# Patient Record
Sex: FEMALE | Race: WHITE | Employment: OTHER | URBAN - METROPOLITAN AREA
[De-identification: names, ages, dates, MRNs, and addresses within clinical notes are randomized per-mention and may not be internally consistent; named-entity substitution may affect disease eponyms.]

---

## 2017-05-05 ENCOUNTER — HOSPITAL ENCOUNTER (OUTPATIENT)
Dept: MAMMOGRAPHY | Age: 72
Discharge: HOME | End: 2017-05-05
Attending: INTERNAL MEDICINE
Payer: MEDICARE

## 2017-05-05 DIAGNOSIS — Z12.31 ENCOUNTER FOR SCREENING MAMMOGRAM FOR BREAST CANCER: ICD-10-CM

## 2017-05-05 PROCEDURE — 77067 SCR MAMMO BI INCL CAD: CPT

## 2017-05-12 ENCOUNTER — HOSPITAL ENCOUNTER (OUTPATIENT)
Dept: MAMMOGRAPHY | Age: 72
Discharge: HOME | End: 2017-05-12
Attending: INTERNAL MEDICINE
Payer: MEDICARE

## 2017-05-12 DIAGNOSIS — R92.8 ABNORMAL SCREENING MAMMOGRAM: ICD-10-CM

## 2017-05-12 PROCEDURE — 76642 ULTRASOUND BREAST LIMITED: CPT

## 2017-05-12 PROCEDURE — 77065 DX MAMMO INCL CAD UNI: CPT

## 2018-09-05 ENCOUNTER — HOSPITAL ENCOUNTER (OUTPATIENT)
Dept: MAMMOGRAPHY | Age: 73
Discharge: HOME | End: 2018-09-05
Payer: MEDICARE

## 2018-09-05 DIAGNOSIS — Z87.81 HISTORY OF FRACTURE: ICD-10-CM

## 2018-09-05 PROCEDURE — 77080 DXA BONE DENSITY AXIAL: CPT

## 2018-09-12 ENCOUNTER — HOSPITAL ENCOUNTER (OUTPATIENT)
Dept: NON INVASIVE DIAGNOSTICS | Age: 73
Discharge: HOME | End: 2018-09-12
Payer: MEDICARE

## 2018-09-12 DIAGNOSIS — I49.9 CARDIAC ARRHYTHMIA, UNSPECIFIED CARDIAC ARRHYTHMIA TYPE: ICD-10-CM

## 2018-09-12 PROCEDURE — 93306 TTE W/DOPPLER COMPLETE: CPT

## 2019-09-18 ENCOUNTER — HOSPITAL ENCOUNTER (OUTPATIENT)
Dept: MAMMOGRAPHY | Age: 74
Discharge: HOME | End: 2019-09-18
Attending: INTERNAL MEDICINE
Payer: MEDICARE

## 2019-09-18 DIAGNOSIS — Z12.39 SCREENING FOR BREAST CANCER: ICD-10-CM

## 2019-09-18 PROCEDURE — 77067 SCR MAMMO BI INCL CAD: CPT

## 2019-11-13 ENCOUNTER — HOSPITAL ENCOUNTER (OUTPATIENT)
Dept: ULTRASOUND IMAGING | Age: 74
Discharge: HOME | End: 2019-11-13
Payer: MEDICARE

## 2019-11-13 DIAGNOSIS — I70.202 ATHEROSCLEROSIS OF ARTERY OF LEFT LOWER EXTREMITY (HCC): ICD-10-CM

## 2019-11-13 PROCEDURE — 93925 LOWER EXTREMITY STUDY: CPT

## 2020-03-10 ENCOUNTER — APPOINTMENT (OUTPATIENT)
Dept: GENERAL RADIOLOGY | Age: 75
DRG: 310 | End: 2020-03-10
Attending: EMERGENCY MEDICINE
Payer: MEDICARE

## 2020-03-10 ENCOUNTER — HOSPITAL ENCOUNTER (INPATIENT)
Age: 75
LOS: 2 days | Discharge: HOME | DRG: 310 | End: 2020-03-13
Attending: EMERGENCY MEDICINE | Admitting: HOSPITALIST
Payer: MEDICARE

## 2020-03-10 DIAGNOSIS — I48.91 ATRIAL FIBRILLATION WITH RVR (HCC): Primary | ICD-10-CM

## 2020-03-10 DIAGNOSIS — I50.9 ACUTE ON CHRONIC CONGESTIVE HEART FAILURE, UNSPECIFIED HEART FAILURE TYPE (HCC): ICD-10-CM

## 2020-03-10 LAB
BASOPHILS # BLD: 0.1 K/UL (ref 0–0.2)
BASOPHILS NFR BLD: 1 % (ref 0–2)
DIFFERENTIAL METHOD BLD: NORMAL
EOSINOPHIL # BLD: 0.2 K/UL (ref 0–0.8)
EOSINOPHIL NFR BLD: 3 % (ref 0.5–7.8)
ERYTHROCYTE [DISTWIDTH] IN BLOOD BY AUTOMATED COUNT: 14.3 % (ref 11.9–14.6)
HCT VFR BLD AUTO: 36.9 % (ref 35.8–46.3)
HGB BLD-MCNC: 12.3 G/DL (ref 11.7–15.4)
IMM GRANULOCYTES # BLD AUTO: 0 K/UL (ref 0–0.5)
IMM GRANULOCYTES NFR BLD AUTO: 0 % (ref 0–5)
LYMPHOCYTES # BLD: 1.4 K/UL (ref 0.5–4.6)
LYMPHOCYTES NFR BLD: 20 % (ref 13–44)
MCH RBC QN AUTO: 29.4 PG (ref 26.1–32.9)
MCHC RBC AUTO-ENTMCNC: 33.3 G/DL (ref 31.4–35)
MCV RBC AUTO: 88.3 FL (ref 79.6–97.8)
MONOCYTES # BLD: 0.6 K/UL (ref 0.1–1.3)
MONOCYTES NFR BLD: 8 % (ref 4–12)
NEUTS SEG # BLD: 4.9 K/UL (ref 1.7–8.2)
NEUTS SEG NFR BLD: 68 % (ref 43–78)
NRBC # BLD: 0 K/UL (ref 0–0.2)
PLATELET # BLD AUTO: 243 K/UL (ref 150–450)
PMV BLD AUTO: 11 FL (ref 9.4–12.3)
RBC # BLD AUTO: 4.18 M/UL (ref 4.05–5.2)
WBC # BLD AUTO: 7.1 K/UL (ref 4.3–11.1)

## 2020-03-10 PROCEDURE — 93005 ELECTROCARDIOGRAM TRACING: CPT | Performed by: EMERGENCY MEDICINE

## 2020-03-10 PROCEDURE — 84443 ASSAY THYROID STIM HORMONE: CPT

## 2020-03-10 PROCEDURE — 83735 ASSAY OF MAGNESIUM: CPT

## 2020-03-10 PROCEDURE — 84484 ASSAY OF TROPONIN QUANT: CPT

## 2020-03-10 PROCEDURE — 85025 COMPLETE CBC W/AUTO DIFF WBC: CPT

## 2020-03-10 PROCEDURE — 96374 THER/PROPH/DIAG INJ IV PUSH: CPT

## 2020-03-10 PROCEDURE — 71045 X-RAY EXAM CHEST 1 VIEW: CPT

## 2020-03-10 PROCEDURE — 80053 COMPREHEN METABOLIC PANEL: CPT

## 2020-03-10 PROCEDURE — 74011000250 HC RX REV CODE- 250: Performed by: EMERGENCY MEDICINE

## 2020-03-10 PROCEDURE — 99285 EMERGENCY DEPT VISIT HI MDM: CPT

## 2020-03-10 PROCEDURE — 83880 ASSAY OF NATRIURETIC PEPTIDE: CPT

## 2020-03-10 RX ORDER — DILTIAZEM HYDROCHLORIDE 5 MG/ML
20 INJECTION INTRAVENOUS
Status: COMPLETED | OUTPATIENT
Start: 2020-03-10 | End: 2020-03-10

## 2020-03-10 RX ORDER — LISINOPRIL 40 MG/1
40 TABLET ORAL
COMMUNITY
End: 2020-03-31 | Stop reason: ALTCHOICE

## 2020-03-10 RX ADMIN — DILTIAZEM HYDROCHLORIDE 20 MG: 5 INJECTION INTRAVENOUS at 23:54

## 2020-03-11 PROBLEM — R00.2 PALPITATIONS: Status: ACTIVE | Noted: 2020-03-11

## 2020-03-11 PROBLEM — I48.91 ATRIAL FIBRILLATION WITH RVR (HCC): Status: ACTIVE | Noted: 2020-03-11

## 2020-03-11 PROBLEM — I10 HTN (HYPERTENSION): Status: ACTIVE | Noted: 2020-03-11

## 2020-03-11 LAB
ALBUMIN SERPL-MCNC: 3.5 G/DL (ref 3.2–4.6)
ALBUMIN/GLOB SERPL: 1 {RATIO} (ref 1.2–3.5)
ALP SERPL-CCNC: 151 U/L (ref 50–136)
ALT SERPL-CCNC: 63 U/L (ref 12–65)
ANION GAP SERPL CALC-SCNC: 7 MMOL/L (ref 7–16)
ANION GAP SERPL CALC-SCNC: 9 MMOL/L (ref 7–16)
AST SERPL-CCNC: 40 U/L (ref 15–37)
ATRIAL RATE: 153 BPM
BASOPHILS # BLD: 0.1 K/UL (ref 0–0.2)
BASOPHILS NFR BLD: 1 % (ref 0–2)
BILIRUB SERPL-MCNC: 0.6 MG/DL (ref 0.2–1.1)
BNP SERPL-MCNC: 2233 PG/ML
BUN SERPL-MCNC: 19 MG/DL (ref 8–23)
BUN SERPL-MCNC: 22 MG/DL (ref 8–23)
CALCIUM SERPL-MCNC: 8.8 MG/DL (ref 8.3–10.4)
CALCIUM SERPL-MCNC: 8.9 MG/DL (ref 8.3–10.4)
CALCULATED R AXIS, ECG10: 59 DEGREES
CALCULATED T AXIS, ECG11: -143 DEGREES
CHLORIDE SERPL-SCNC: 104 MMOL/L (ref 98–107)
CHLORIDE SERPL-SCNC: 111 MMOL/L (ref 98–107)
CO2 SERPL-SCNC: 27 MMOL/L (ref 21–32)
CO2 SERPL-SCNC: 28 MMOL/L (ref 21–32)
CREAT SERPL-MCNC: 0.94 MG/DL (ref 0.6–1)
CREAT SERPL-MCNC: 1.01 MG/DL (ref 0.6–1)
DIAGNOSIS, 93000: NORMAL
DIFFERENTIAL METHOD BLD: NORMAL
EOSINOPHIL # BLD: 0.2 K/UL (ref 0–0.8)
EOSINOPHIL NFR BLD: 2 % (ref 0.5–7.8)
ERYTHROCYTE [DISTWIDTH] IN BLOOD BY AUTOMATED COUNT: 13.9 % (ref 11.9–14.6)
GLOBULIN SER CALC-MCNC: 3.4 G/DL (ref 2.3–3.5)
GLUCOSE SERPL-MCNC: 110 MG/DL (ref 65–100)
GLUCOSE SERPL-MCNC: 145 MG/DL (ref 65–100)
HCT VFR BLD AUTO: 38.6 % (ref 35.8–46.3)
HGB BLD-MCNC: 12.8 G/DL (ref 11.7–15.4)
IMM GRANULOCYTES # BLD AUTO: 0 K/UL (ref 0–0.5)
IMM GRANULOCYTES NFR BLD AUTO: 0 % (ref 0–5)
LYMPHOCYTES # BLD: 1 K/UL (ref 0.5–4.6)
LYMPHOCYTES NFR BLD: 13 % (ref 13–44)
MAGNESIUM SERPL-MCNC: 2.1 MG/DL (ref 1.8–2.4)
MCH RBC QN AUTO: 29.2 PG (ref 26.1–32.9)
MCHC RBC AUTO-ENTMCNC: 33.2 G/DL (ref 31.4–35)
MCV RBC AUTO: 87.9 FL (ref 79.6–97.8)
MONOCYTES # BLD: 0.8 K/UL (ref 0.1–1.3)
MONOCYTES NFR BLD: 10 % (ref 4–12)
NEUTS SEG # BLD: 5.9 K/UL (ref 1.7–8.2)
NEUTS SEG NFR BLD: 75 % (ref 43–78)
NRBC # BLD: 0 K/UL (ref 0–0.2)
PLATELET # BLD AUTO: 244 K/UL (ref 150–450)
PMV BLD AUTO: 10.9 FL (ref 9.4–12.3)
POTASSIUM SERPL-SCNC: 3.1 MMOL/L (ref 3.5–5.1)
POTASSIUM SERPL-SCNC: 3.6 MMOL/L (ref 3.5–5.1)
PROT SERPL-MCNC: 6.9 G/DL (ref 6.3–8.2)
Q-T INTERVAL, ECG07: 256 MS
QRS DURATION, ECG06: 78 MS
QTC CALCULATION (BEZET), ECG08: 403 MS
RBC # BLD AUTO: 4.39 M/UL (ref 4.05–5.2)
SODIUM SERPL-SCNC: 141 MMOL/L (ref 136–145)
SODIUM SERPL-SCNC: 145 MMOL/L (ref 136–145)
TROPONIN I SERPL-MCNC: <0.02 NG/ML (ref 0.02–0.05)
TSH SERPL DL<=0.005 MIU/L-ACNC: 2.46 UIU/ML
VENTRICULAR RATE, ECG03: 149 BPM
WBC # BLD AUTO: 7.9 K/UL (ref 4.3–11.1)

## 2020-03-11 PROCEDURE — 80048 BASIC METABOLIC PNL TOTAL CA: CPT

## 2020-03-11 PROCEDURE — 74011250637 HC RX REV CODE- 250/637: Performed by: INTERNAL MEDICINE

## 2020-03-11 PROCEDURE — 85025 COMPLETE CBC W/AUTO DIFF WBC: CPT

## 2020-03-11 PROCEDURE — 74011000258 HC RX REV CODE- 258: Performed by: INTERNAL MEDICINE

## 2020-03-11 PROCEDURE — 74011250636 HC RX REV CODE- 250/636: Performed by: EMERGENCY MEDICINE

## 2020-03-11 PROCEDURE — 74011250636 HC RX REV CODE- 250/636: Performed by: INTERNAL MEDICINE

## 2020-03-11 PROCEDURE — 74011250637 HC RX REV CODE- 250/637: Performed by: FAMILY MEDICINE

## 2020-03-11 PROCEDURE — 36415 COLL VENOUS BLD VENIPUNCTURE: CPT

## 2020-03-11 PROCEDURE — 74011000258 HC RX REV CODE- 258: Performed by: FAMILY MEDICINE

## 2020-03-11 PROCEDURE — 74011250636 HC RX REV CODE- 250/636: Performed by: FAMILY MEDICINE

## 2020-03-11 PROCEDURE — 65610000001 HC ROOM ICU GENERAL

## 2020-03-11 PROCEDURE — 74011000258 HC RX REV CODE- 258: Performed by: EMERGENCY MEDICINE

## 2020-03-11 PROCEDURE — 93306 TTE W/DOPPLER COMPLETE: CPT

## 2020-03-11 RX ORDER — LISINOPRIL 20 MG/1
40 TABLET ORAL DAILY
Status: DISCONTINUED | OUTPATIENT
Start: 2020-03-11 | End: 2020-03-12 | Stop reason: HOSPADM

## 2020-03-11 RX ORDER — SODIUM CHLORIDE 0.9 % (FLUSH) 0.9 %
5-40 SYRINGE (ML) INJECTION AS NEEDED
Status: DISCONTINUED | OUTPATIENT
Start: 2020-03-11 | End: 2020-03-12 | Stop reason: HOSPADM

## 2020-03-11 RX ORDER — ENOXAPARIN SODIUM 100 MG/ML
40 INJECTION SUBCUTANEOUS EVERY 24 HOURS
Status: DISCONTINUED | OUTPATIENT
Start: 2020-03-11 | End: 2020-03-11

## 2020-03-11 RX ORDER — ADHESIVE BANDAGE
30 BANDAGE TOPICAL DAILY PRN
Status: DISCONTINUED | OUTPATIENT
Start: 2020-03-11 | End: 2020-03-12 | Stop reason: HOSPADM

## 2020-03-11 RX ORDER — SODIUM CHLORIDE 0.9 % (FLUSH) 0.9 %
5-40 SYRINGE (ML) INJECTION EVERY 8 HOURS
Status: DISCONTINUED | OUTPATIENT
Start: 2020-03-11 | End: 2020-03-12 | Stop reason: HOSPADM

## 2020-03-11 RX ORDER — FUROSEMIDE 10 MG/ML
40 INJECTION INTRAMUSCULAR; INTRAVENOUS
Status: COMPLETED | OUTPATIENT
Start: 2020-03-11 | End: 2020-03-11

## 2020-03-11 RX ORDER — ACETAMINOPHEN 325 MG/1
650 TABLET ORAL
Status: DISCONTINUED | OUTPATIENT
Start: 2020-03-11 | End: 2020-03-12 | Stop reason: HOSPADM

## 2020-03-11 RX ADMIN — POTASSIUM BICARBONATE 40 MEQ: 782 TABLET, EFFERVESCENT ORAL at 09:02

## 2020-03-11 RX ADMIN — ENOXAPARIN SODIUM 40 MG: 40 INJECTION SUBCUTANEOUS at 05:35

## 2020-03-11 RX ADMIN — POTASSIUM BICARBONATE 40 MEQ: 782 TABLET, EFFERVESCENT ORAL at 18:27

## 2020-03-11 RX ADMIN — Medication 10 ML: at 21:16

## 2020-03-11 RX ADMIN — SODIUM CHLORIDE 14.5 MG/HR: 900 INJECTION, SOLUTION INTRAVENOUS at 09:31

## 2020-03-11 RX ADMIN — SODIUM CHLORIDE 20 MG/HR: 900 INJECTION, SOLUTION INTRAVENOUS at 19:31

## 2020-03-11 RX ADMIN — Medication 10 ML: at 00:54

## 2020-03-11 RX ADMIN — Medication 10 ML: at 05:36

## 2020-03-11 RX ADMIN — APIXABAN 5 MG: 5 TABLET, FILM COATED ORAL at 21:15

## 2020-03-11 RX ADMIN — SODIUM CHLORIDE 2.5 MG/HR: 900 INJECTION, SOLUTION INTRAVENOUS at 00:16

## 2020-03-11 RX ADMIN — SODIUM CHLORIDE 20 MG/HR: 900 INJECTION, SOLUTION INTRAVENOUS at 14:25

## 2020-03-11 RX ADMIN — FUROSEMIDE 40 MG: 10 INJECTION, SOLUTION INTRAMUSCULAR; INTRAVENOUS at 00:53

## 2020-03-11 RX ADMIN — ACETAMINOPHEN 650 MG: 325 TABLET, FILM COATED ORAL at 13:09

## 2020-03-11 RX ADMIN — LISINOPRIL 40 MG: 20 TABLET ORAL at 08:58

## 2020-03-11 RX ADMIN — SODIUM CHLORIDE 20 MG/HR: 900 INJECTION, SOLUTION INTRAVENOUS at 23:48

## 2020-03-11 RX ADMIN — APIXABAN 5 MG: 5 TABLET, FILM COATED ORAL at 09:31

## 2020-03-11 RX ADMIN — Medication 10 ML: at 16:41

## 2020-03-11 RX ADMIN — ACETAMINOPHEN 650 MG: 325 TABLET, FILM COATED ORAL at 23:45

## 2020-03-11 NOTE — PROGRESS NOTES
Nutrition Assessment for:   Best Practice Alert for Malnutrition Screening Tool: Recently Lost Weight Without Trying: No, If Yes, How Much Weight Loss: Unsure, Eating Poorly Due to Decreased Appetite: No    Assessment:  DIET CARDIAC Regular    Patient is a new admission and breakfast is to be first meal. The patient reports good appetite and will likely meet her needs. The patient is noted to have a h/o A-fib and HTN. She is currently admitted due to her A-fib. She denies any weight loss or po difficulties. She has no questions or concerns for nutrition at this time. Anthropometrics:  Height: 5' 5\" (165.1 cm), Weight: 77.1 kg (170 lb),  , Body mass index is 28.29 kg/m². BMI class of overweight. Macronutrient needs: (using Admission weight 77.1 kg)  EER: 1164-4218 kcal/day (20-25 kcal/kg)  EPR: 77-96 g/day (1-1.25 g/kg)    Nutrition Diagnosis:  No nutrition diagnosis at this time. Nutrition Intervention:  Meals and snacks: Continue current diet  Coordination of nutrition care by a Nutrition Professional: ICU rounds  Discharge Nutrition Plan: No discharge needs at this time. Veda Agosto [address] 85, [address],    [phone]

## 2020-03-11 NOTE — PROGRESS NOTES
Interdisciplinary team rounds were held 3/11/2020 with the following team members:Care Management, Nursing, Pastoral Care, Physical Therapy and Clinical Coordinator and the patient. Plan of care discussed. See clinical pathway and/or care plan for interventions and desired outcomes.

## 2020-03-11 NOTE — PROGRESS NOTES
Patient has come to terms with pure wick almost a liter out with crystal clear urine  Some leg cramps  Orange juice given   Waiting on lab results

## 2020-03-11 NOTE — PROGRESS NOTES
Patient was present in the ICU during an [address] [facility] record downtime, therefore, all elements of charting may not be present in the immediate view of the electronic medical record. Please see written and/or manual documentation associated with this visit for a complete documentation of this visit. Patient here with complaint of palpitations and found to have atrial fibrillation. She has no chest pain but has bladder urgency as she has received Lasix.   Bathed per ICU protocol and found to have intact pink skin with a small birthmark on the left shoulder

## 2020-03-11 NOTE — CONSULTS
[address] [facility]    Name:  Marilu Carlos  MR#:  [id number]  :  1945  ACCOUNT #:  [de-identified]  DATE OF SERVICE:  2020    CARDIOLOGY CONSULTATION NOTE    REFERRING PHYSICIAN:  Dr. Nick Mullen. CLINICAL INDICATION FOR CONSULTATION:  Atrial fibrillation. HISTORY OF PRESENT ILLNESS:  The patient is a 68-year-old  female whom was admitted to [address] [place] [address] Intensive Care Unit via the emergency room with new-onset atrial fibrillation. She states that she has a longstanding history of systemic hypertension, which has been managed with lisinopril. She states that she has noticed her blood pressure elevated over the last several days. She states she went out to eat supper with her  and friends last night and felt fine, but upon arriving home, she began not feeling well. She states she took her blood pressure and it was elevated approximately 170/120. She then sought medical attention. She apparently went to an urgent care facility and was transferred to the ER, where she was diagnosed with atrial fibrillation with rapid ventricular response rate. She was started on IV Cardizem and later started on Eliquis. We were asked to see the patient in regards to her presenting symptoms. The patient denies any prior history of cardiovascular disease. She denies knowledge of previous atrial fibrillation, stroke, TIA, valvular heart disease, myocardial infarction, chest pain, congestive heart failure, diabetes, or vascular disease. ADDITIONAL PAST MEDICAL HISTORY:  Hypertension. FAMILY HISTORY:  Father  in his early 80 secondary to advanced age. Mother  at 80years of age secondary to a CVA. She had a history of coronary artery disease. SOCIAL HISTORY:  The patient is . She does not smoke or drink. HOME MEDICATION:  Lisinopril 40 mg daily. ALLERGIES:  NO STATED ALLERGIES.     REVIEW OF SYSTEMS:  SKIN:  The patient denies rash.  NEURO:  The patient denies stroke, TIA, or seizure. HEENT:  The patient denies headache. PULMONARY:  The patient denies cough, fever, chills, COPD, or hemoptysis. GI:  The patient denies melena, hematochezia, or hematemesis. :  The patient denies hematuria or dysuria. MUSCULOSKELETAL:  The patient denies recent fall or fracture. ENDOCRINE:  The patient denies diabetes or thyroid disease. HEMATOLOGIC:  The patient denies any bleeding issues or anemia. PSYCH:  The patient denies anxiety or depression. PHYSICAL EXAMINATION:  VITAL SIGNS:  Blood pressure 115/70, pulse is 110, respirations 16. GENERAL:  The patient is a pleasant elderly female, in no acute distress. SKIN:  Warm and dry. NEURO:  Alert and oriented. PSYCH:  Normal mood and affect. HEENT:  Normocephalic, atraumatic. Pupils reactive to light. NECK:  Supple. 2+ carotid upstrokes without bruits or JVD. CHEST:  Clear. CARDIAC EXAM:  Reveals an irregular rate and rhythm without significant audible murmur, rub, or gallop. ABDOMEN:  Soft, nondistended without masses. EXTREMITIES:  Show no cyanosis, edema, or clubbing. AVAILABLE LAB DATA:  1. ECG showed atrial fibrillation with low QRS voltage and poor R-wave progression. 2.  CBC:  WBC is 7.9, hemoglobin is 12.8, hematocrit is 38.6, platelet count is 932,194. 3.  Electrolytes:  Sodium is 141, potassium is 3.1, chloride is 104, CO2 is 28, creatinine is 0.94, BUN is 19, glucose is 145. NT-proBNP on admission was 2233. Chest x-ray on admission showed borderline cardiomegaly with mild vascular congestion. IMPRESSION AND PLAN:  1. Presumed new-onset atrial fibrillation with rapid ventricular response rate: The patient has been admitted to the ICU, which I agree with continuous telemetry. She has a [id number] score of 4, which places her at increased risk of thromboembolic events in the setting of atrial fibrillation.   It would appear the oral anticoagulation therapy is indicated. She has been started on Eliquis and I would agree with the addition of this medicine. Her heart rate control is being maintained with IV Cardizem and I agree with this at the present time. Transthoracic echo has been completed, the results are presently pending. This will be reviewed. If transthoracic echo study is unremarkable and she remains in atrial fibrillation, then I would consider trying to reestablish normal sinus rhythm with a transesophageal echo directed cardioversion within the next 24 hours. I explained the indications, risks, complications of transesophageal echo, electrical cardioversion as well as chronic oral anticoagulation therapy. She understands and wishes to proceed in this manner. 2.  Systemic hypertension:  The patient will be maintained on her present oral home antihypertension medication in the form of lisinopril. Considerations regarding the addition of beta blocker or calcium channel blocker may be considered in the setting of new-onset atrial fibrillation. 3.  Hypokalemia:  Additional potassium supplementation has been added. We will do a followup BNP in the morning to make sure that her electrolyte status is stable.     We will follow along with you clinically and additional recommendations forthcoming      MD ELMER [email or web address]  D:  03/11/2020 11:45  T:  03/11/2020 13:29  JOB #:  [id number]

## 2020-03-11 NOTE — CONSULTS
[facility] CARDIOLOGY PROGRESS NOTE           3/11/2020 11:22 AM    Admit Date: 3/10/2020      Subjective: The patient is seen and examined in ICU. Consult requested for new onset atrial fibrillation. Consult dictated. DCH#:[id number]. She reports elevated BP last night with suddenly not feeling well. In the ER atrial fibrillation with RVR was dx. She has been started on an iv Cardizem drip and Eliquis. She reports no prior CV hx.  PMH:Hypertension. FH: Father  in his 80\"s. Mother  atv 81 yo due CAD and CVA  SH:. No Tobacco or ETOH: use. ROS:  GEN:  No fever or chills  Cardiovascular:  As noted above:no CP or palpitations. Pulmonary:  As noted above:no cough or SOB. No COPD. Neuro:  No new focal motor or sensory loss. No hx of CVA/TIA. GI:No melena or bleeding. Vasc:no PAD,AAA,dissection,or aortic plague. Objective:      Vitals:    20 0710 20 0728 20 0743 20 0813   BP: 95/73 124/72 114/89 122/79   Pulse: (!) 142 (!) 117 (!) 123 (!) 139   Resp: 17 23 24 16   Temp: 97.8 °F (36.6 °C)      SpO2: 93% 92% 94% 94%   Weight:       Height:           Physical Exam:  General-no distress  Neck- supple, no JVD  CV- irregular rate and rhythm no MRG  Lung- clear bilaterally  Abd- soft, nontender, nondistended  Ext- no edema bilaterally. Skin- warm and dry  Psychiatric:  Normal mood and affect. Neurologic:  Alert and oriented X 3      Data Review:   Recent Labs     20  0438 03/10/20  2336    145   K 3.1* 3.6   MG  --  2.1   BUN 19 22   CREA 0.94 1.01*   * 110*   WBC 7.9 7.1   HGB 12.8 12.3   HCT 38.6 36.9    243       TELEMETRY:  AF with HR in 100-110. Assessment/Plan:     Principal Problem:    Atrial fibrillation with RVR ([facility]) (3/11/2020):[id number]=6.Agree with iv Cardizem infusion to control tachycardia. Agree with [facility] ( NOAC). Review TTE and if unremarkable consider LUCI cardioversion to attempt to re establish NSR. I explained risks,complications,and indications of OAC,cardioversion,and LUCI. Correct hypokalemia. Will follow with you. Active Problems:    HTN (hypertension) (3/11/2020):Continue home medication ( ie Lisinopril). Hypokalemia:Add KCL. BMP in am.              Rafy Tran MD  3/11/2020 11:22 AM

## 2020-03-11 NOTE — PROGRESS NOTES
Problem: Atrial fibrillation disease management teaching  Goal: *Able to cope  Outcome: Progressing Towards Goal

## 2020-03-11 NOTE — PROGRESS NOTES
Hospitalist Note     Admit Date:  3/10/2020 11:19 PM   Name:  Roselyn Henriquez   Age:  66 y.o.  :  1945   MRN:  [id number]   PCP:  John Ramirez MD  Treatment Team: Attending Provider: Katherine Yuen MD; Staff Nurse: Jimbo Linares RN    HPI/Subjective:   Mrs. Alicia Johnson is a nice 65 y/o WF with a h/o HTN who was admitted on 3/11 with new onset AFib RVR.    3/11: Cardizem @ 12; rates still fluctuating 120s-130s. BPs good. She feels better. Son and  at bedside, updated. ROS neg otherwise.     No other complaints  Objective:     Patient Vitals for the past 24 hrs:   Temp Pulse Resp BP SpO2   20 0813  (!) 139 16 122/79 94 %   20 0743  (!) 123 24 114/89 94 %   20 0728  (!) 117 23 124/72 92 %   20 0710 97.8 °F (36.6 °C) (!) 142 17 95/73 93 %   20 0643  (!) 119 14 125/89 (!) 87 %   20 0628  (!) 118 15 114/85 (!) 86 %   20 0613  (!) 135 14 110/71 95 %   20 0558  (!) 140  98/76 94 %   20 0543  (!) 123  107/79 96 %   20 0528  (!) 118 10 119/76 92 %   20 0513  (!) 124 8 123/69 93 %   20 0458  (!) 115 17 126/77 93 %   20 0452    140/75    20 0443  (!) 118 19 140/76 92 %   20 0428  (!) 118 20 137/79 94 %   20 0413  (!) 122 13 134/82 92 %   20 0345 98 °F (36.7 °C) (!) 130  102/68    20 0330  (!) 140  112/74    20 0315  (!) 144  119/80    20 0300  (!) 128  120/74    20 0245  (!) 130  125/74    20 0230  (!) 147  126/78    20 0215  (!) 132 22 119/75    20 0200  (!) 129 20 132/74    20 0145  (!) 144 22 (!) 160/98    20 0125 98.7 °F (37.1 °C) (!) 128 22 160/88 95 %   20 0052  (!) 109 24  94 %   20 0050    144/87    20 0048 98.2 °F (36.8 °C) (!) 115 18 (!) 136/94 94 %   20 0047  (!) 142 16 (!) 136/94 94 %   20 0046  (!) 120      20 0045  (!) 108      20 0044  (!) 104      03/11/20 0003  (!) 119  (!) 144/96 95 %   03/10/20 2357    (!) 146/95 95 %   03/10/20 2354  (!) 150 16 (!) 183/120 95 %   03/10/20 2353  (!) 135  (!) 183/120 96 %   03/10/20 2327  (!) 161 18  95 %   03/10/20 2313 97.8 °F (36.6 °C) (!) 138 18 (!) 174/100 97 %     Oxygen Therapy  O2 Sat (%): 94 % (03/11/20 0813)  Pulse via Oximetry: 145 beats per minute (03/11/20 0813)  O2 Device: Room air (03/11/20 0125)    Estimated body mass index is 28.29 kg/m² as calculated from the following:    Height as of this encounter: 5' 5\" (1.651 m). Weight as of this encounter: 77.1 kg (170 lb). Intake/Output Summary (Last 24 hours) at 3/11/2020 0856  Last data filed at 3/11/2020 0545  Gross per 24 hour   Intake 41.24 ml   Output 900 ml   Net -858.76 ml       *Note that automatically entered I/Os may not be accurate; dependent on patient compliance with collection and accurate  by [employer]. General:    Well nourished. Alert. CV:   Irreg irreg, tachycardia. No murmur, rub, or gallop. Lungs:   CTAB. No wheezing, rhonchi, or rales. Abdomen:   Soft, nontender, nondistended. Extremities: Warm and dry. No cyanosis or edema. Skin:     No rashes or jaundice.    Neuro:  No gross focal deficits    Data Review:  I have reviewed all labs, meds, and studies from the last 24 hours:    Recent Results (from the past 24 hour(s))   CBC WITH AUTOMATED DIFF    Collection Time: 03/10/20 11:36 PM   Result Value Ref Range    WBC 7.1 4.3 - 11.1 K/uL    RBC 4.18 4.05 - 5.2 M/uL    HGB 12.3 11.7 - 15.4 g/dL    HCT 36.9 35.8 - 46.3 %    MCV 88.3 79.6 - 97.8 FL    MCH 29.4 26.1 - 32.9 PG    MCHC 33.3 31.4 - 35.0 g/dL    RDW 14.3 11.9 - 14.6 %    PLATELET 629 390 - 107 K/uL    MPV 11.0 9.4 - 12.3 FL    ABSOLUTE NRBC 0.00 0.0 - 0.2 K/uL    DF AUTOMATED      NEUTROPHILS 68 43 - 78 %    LYMPHOCYTES 20 13 - 44 %    MONOCYTES 8 4.0 - 12.0 %    EOSINOPHILS 3 0.5 - 7.8 %    BASOPHILS 1 0.0 - 2.0 %    IMMATURE GRANULOCYTES 0 0.0 - 5.0 %    ABS. NEUTROPHILS 4.9 1.7 - 8.2 K/UL    ABS. LYMPHOCYTES 1.4 0.5 - 4.6 K/UL    ABS. MONOCYTES 0.6 0.1 - 1.3 K/UL    ABS. EOSINOPHILS 0.2 0.0 - 0.8 K/UL    ABS. BASOPHILS 0.1 0.0 - 0.2 K/UL    ABS. IMM. GRANS. 0.0 0.0 - 0.5 K/UL   METABOLIC PANEL, COMPREHENSIVE    Collection Time: 03/10/20 11:36 PM   Result Value Ref Range    Sodium 145 136 - 145 mmol/L    Potassium 3.6 3.5 - 5.1 mmol/L    Chloride 111 (H) 98 - 107 mmol/L    CO2 27 21 - 32 mmol/L    Anion gap 7 7 - 16 mmol/L    Glucose 110 (H) 65 - 100 mg/dL    BUN 22 8 - 23 MG/DL    Creatinine 1.01 (H) 0.6 - 1.0 MG/DL    GFR est AA >60 >60 ml/min/1.73m2    GFR est non-AA 57 (L) >60 ml/min/1.73m2    Calcium 8.9 8.3 - 10.4 MG/DL    Bilirubin, total 0.6 0.2 - 1.1 MG/DL    ALT (SGPT) 63 12 - 65 U/L    AST (SGOT) 40 (H) 15 - 37 U/L    Alk.  phosphatase 151 (H) 50 - 136 U/L    Protein, total 6.9 6.3 - 8.2 g/dL    Albumin 3.5 3.2 - 4.6 g/dL    Globulin 3.4 2.3 - 3.5 g/dL    A-G Ratio 1.0 (L) 1.2 - 3.5     TROPONIN I    Collection Time: 03/10/20 11:36 PM   Result Value Ref Range    Troponin-I, Qt. <0.02 (L) 0.02 - 0.05 NG/ML   MAGNESIUM    Collection Time: 03/10/20 11:36 PM   Result Value Ref Range    Magnesium 2.1 1.8 - 2.4 mg/dL   TSH 3RD GENERATION    Collection Time: 03/10/20 11:36 PM   Result Value Ref Range    TSH 2.460 uIU/mL   NT-PRO BNP    Collection Time: 03/10/20 11:36 PM   Result Value Ref Range    NT pro-BNP 2,233 (H) <463 PG/ML   METABOLIC PANEL, BASIC    Collection Time: 03/11/20  4:38 AM   Result Value Ref Range    Sodium 141 136 - 145 mmol/L    Potassium 3.1 (L) 3.5 - 5.1 mmol/L    Chloride 104 98 - 107 mmol/L    CO2 28 21 - 32 mmol/L    Anion gap 9 7 - 16 mmol/L    Glucose 145 (H) 65 - 100 mg/dL    BUN 19 8 - 23 MG/DL    Creatinine 0.94 0.6 - 1.0 MG/DL    GFR est AA >60 >60 ml/min/1.73m2    GFR est non-AA >60 >60 ml/min/1.73m2    Calcium 8.8 8.3 - 10.4 MG/DL   CBC WITH AUTOMATED DIFF    Collection Time: 03/11/20  4:38 AM Result Value Ref Range    WBC 7.9 4.3 - 11.1 K/uL    RBC 4.39 4.05 - 5.2 M/uL    HGB 12.8 11.7 - 15.4 g/dL    HCT 38.6 35.8 - 46.3 %    MCV 87.9 79.6 - 97.8 FL    MCH 29.2 26.1 - 32.9 PG    MCHC 33.2 31.4 - 35.0 g/dL    RDW 13.9 11.9 - 14.6 %    PLATELET 985 618 - 600 K/uL    MPV 10.9 9.4 - 12.3 FL    ABSOLUTE NRBC 0.00 0.0 - 0.2 K/uL    DF AUTOMATED      NEUTROPHILS 75 43 - 78 %    LYMPHOCYTES 13 13 - 44 %    MONOCYTES 10 4.0 - 12.0 %    EOSINOPHILS 2 0.5 - 7.8 %    BASOPHILS 1 0.0 - 2.0 %    IMMATURE GRANULOCYTES 0 0.0 - 5.0 %    ABS. NEUTROPHILS 5.9 1.7 - 8.2 K/UL    ABS. LYMPHOCYTES 1.0 0.5 - 4.6 K/UL    ABS. MONOCYTES 0.8 0.1 - 1.3 K/UL    ABS. EOSINOPHILS 0.2 0.0 - 0.8 K/UL    ABS. BASOPHILS 0.1 0.0 - 0.2 K/UL    ABS. IMM. GRANS. 0.0 0.0 - 0.5 K/UL        All Micro Results     None          Current Meds:  Current Facility-Administered Medications   Medication Dose Route Frequency    lisinopriL (PRINIVIL, ZESTRIL) tablet 40 mg  40 mg Oral DAILY    sodium chloride (NS) flush 5-40 mL  5-40 mL IntraVENous Q8H    sodium chloride (NS) flush 5-40 mL  5-40 mL IntraVENous PRN    acetaminophen (TYLENOL) tablet 650 mg  650 mg Oral Q4H PRN    magnesium hydroxide (MILK OF MAGNESIA) 400 mg/5 mL oral suspension 30 mL  30 mL Oral DAILY PRN    dilTIAZem (CARDIZEM) 100 mg in 0.9% sodium chloride (MBP/ADV) 100 mL infusion  0-15 mg/hr IntraVENous TITRATE    potassium bicarb-citric acid (EFFER-K) tablet 40 mEq  40 mEq Oral BID    apixaban (ELIQUIS) tablet 5 mg  5 mg Oral BID       Other Studies:  No results found for this visit on 03/10/20. Xr Chest Port    Result Date: 3/10/2020  EXAM: Chest x-ray. INDICATION: Palpitations. COMPARISON: None. TECHNIQUE: Frontal view chest x-ray. FINDINGS: The heart is borderline enlarged, with mild vascular congestion, small pleural effusions and mild bibasilar lung atelectasis. No pneumothorax is identified.      IMPRESSION: Findings suspect for mild CHF or fluid overload. Assessment and Plan:     Hospital Problems as of 3/11/2020 Never Reviewed          Codes Class Noted - Resolved POA    * (Principal) Atrial fibrillation with RVR ([address] ICD-10-CM: I48.91  ICD-9-CM: 427.31  3/11/2020 - Present Yes        HTN (hypertension) ICD-10-CM: I10  ICD-9-CM: 401.9  3/11/2020 - Present Yes        Palpitations ICD-10-CM: R00.2  ICD-9-CM: 785.1  3/11/2020 - Present Yes              Plan:  # AFib RVR   - CHADSVASc at least 3. Start Eliquis, con't diltiazem drip   - Cards to see. TTE pending. TSH normal.     # HTN   - Con't lisinopril    DC planning/Dispo: Home when able.   Diet:  DIET CARDIAC  DVT ppx: Eliquis    Signed:  David Singh MD

## 2020-03-11 NOTE — ED TRIAGE NOTES
Pt sent from  for a fib, hypertension. States her heart has been racing on/off for the past few months but she has never been dx with a fib. Pt presents with EKG from [facility].

## 2020-03-11 NOTE — H&P
HOSPITALIST INITIAL HISTORY AND PHYSICAL    NAME:  Yarelis Tubbs   Age:  66 y.o.  :   1945   MRN:   [id number]  PCP: Lisa Toro MD  Consulting MD:  Treatment Team: Attending Provider: Lizzy Art MD; Primary Nurse: Mauro Robles RN    CHIEF COMPLAINT: palpitations    HISTORY OF PRESENT ILLNESS:   Yarelis Tubbs is a 66 y.o. female with a past medical history of HTN who presents to the ER with report of left-sided chest palpitations for the past several days. She denies any chest pain or SOB. Reports that she feels a bit better now. Franklin Kline REVIEW OF SYSTEMS: Comprehensive ROS performed. Denies fevers, chills, nausea, otherwise negative. Past Medical History:   Diagnosis Date    HTN (hypertension)         Past Surgical History:   Procedure Laterality Date    HX BREAST BIOPSY Left        Prior to Admission Medications   Prescriptions Last Dose Informant Patient Reported? Taking?   lisinopriL (PRINIVIL, ZESTRIL) 40 mg tablet   Yes No   Sig: Take 40 mg by mouth. Facility-Administered Medications: None       No Known Allergies    FAMILY HISTORY: Reviewed. Negative except History reviewed. No pertinent family history. Social History     Tobacco Use    Smoking status: Never Smoker    Smokeless tobacco: Never Used   Substance Use Topics    Alcohol use: Never     Frequency: Never         Objective:     Visit Vitals  BP (!) 144/96   Pulse (!) 119   Temp 97.8 °F (36.6 °C)   Resp 16   Ht 5' 5\" (1.651 m)   Wt 77.1 kg (170 lb)   SpO2 95%   BMI 28.29 kg/m²      Temp (24hrs), Av.8 °F (36.6 °C), Min:97.8 °F (36.6 °C), Max:97.8 °F (36.6 °C)    Oxygen Therapy  O2 Sat (%): 95 % (20 0003)  Pulse via Oximetry: 115 beats per minute (20)  O2 Device: Room air (03/10/20 9383)  Physical Exam:  General:    The patient is a pleasant elderly female in no acute distress. Head:   Normocephalic/atraumatic. Eyes:  No palpebral pallor or scleral icterus.   ENT:  External auricular and nasal exam within normal limits. Mucous membranes are moist.  Neck:  Supple, non-tender, no JVD. Lungs:   Clear to auscultation bilaterally without wheezes or crackles. No respiratory distress or accessory muscle use. Heart:   Irregular rhythm, tachycardia without murmurs, rubs, or gallops. Abdomen:   Soft, non-tender, non-distended with normoactive bowel sounds. Genitourinary: No tenderness over the bladder or bilateral CVAs. Extremities: Without clubbing, cyanosis, or edema. Skin:     Normal color, texture, and turgor. No rashes, lesions, or jaundice. Pulses: Radial and dorsalis pedis pulses present 2+ bilaterally. Capillary refill <2s. Neurologic: CN II-XII grossly intact and symmetrical.     Moving all four extremities well with no focal deficits. Psychiatric: Pleasant demeanor, appropriate affect. Alert and oriented x 3    Data Review:   Recent Results (from the past 24 hour(s))   CBC WITH AUTOMATED DIFF    Collection Time: 03/10/20 11:36 PM   Result Value Ref Range    WBC 7.1 4.3 - 11.1 K/uL    RBC 4.18 4.05 - 5.2 M/uL    HGB 12.3 11.7 - 15.4 g/dL    HCT 36.9 35.8 - 46.3 %    MCV 88.3 79.6 - 97.8 FL    MCH 29.4 26.1 - 32.9 PG    MCHC 33.3 31.4 - 35.0 g/dL    RDW 14.3 11.9 - 14.6 %    PLATELET 552 674 - 180 K/uL    MPV 11.0 9.4 - 12.3 FL    ABSOLUTE NRBC 0.00 0.0 - 0.2 K/uL    DF AUTOMATED      NEUTROPHILS 68 43 - 78 %    LYMPHOCYTES 20 13 - 44 %    MONOCYTES 8 4.0 - 12.0 %    EOSINOPHILS 3 0.5 - 7.8 %    BASOPHILS 1 0.0 - 2.0 %    IMMATURE GRANULOCYTES 0 0.0 - 5.0 %    ABS. NEUTROPHILS 4.9 1.7 - 8.2 K/UL    ABS. LYMPHOCYTES 1.4 0.5 - 4.6 K/UL    ABS. MONOCYTES 0.6 0.1 - 1.3 K/UL    ABS. EOSINOPHILS 0.2 0.0 - 0.8 K/UL    ABS. BASOPHILS 0.1 0.0 - 0.2 K/UL    ABS. IMM.  GRANS. 0.0 0.0 - 0.5 K/UL   METABOLIC PANEL, COMPREHENSIVE    Collection Time: 03/10/20 11:36 PM   Result Value Ref Range    Sodium 145 136 - 145 mmol/L    Potassium 3.6 3.5 - 5.1 mmol/L    Chloride 111 (H) 98 - 107 mmol/L    CO2 27 21 - 32 mmol/L    Anion gap 7 7 - 16 mmol/L    Glucose 110 (H) 65 - 100 mg/dL    BUN 22 8 - 23 MG/DL    Creatinine 1.01 (H) 0.6 - 1.0 MG/DL    GFR est AA >60 >60 ml/min/1.73m2    GFR est non-AA 57 (L) >60 ml/min/1.73m2    Calcium 8.9 8.3 - 10.4 MG/DL    Bilirubin, total 0.6 0.2 - 1.1 MG/DL    ALT (SGPT) 63 12 - 65 U/L    AST (SGOT) 40 (H) 15 - 37 U/L    Alk. phosphatase 151 (H) 50 - 136 U/L    Protein, total 6.9 6.3 - 8.2 g/dL    Albumin 3.5 3.2 - 4.6 g/dL    Globulin 3.4 2.3 - 3.5 g/dL    A-G Ratio 1.0 (L) 1.2 - 3.5     TROPONIN I    Collection Time: 03/10/20 11:36 PM   Result Value Ref Range    Troponin-I, Qt. <0.02 (L) 0.02 - 0.05 NG/ML   MAGNESIUM    Collection Time: 03/10/20 11:36 PM   Result Value Ref Range    Magnesium 2.1 1.8 - 2.4 mg/dL   TSH 3RD GENERATION    Collection Time: 03/10/20 11:36 PM   Result Value Ref Range    TSH 2.460 uIU/mL   NT-PRO BNP    Collection Time: 03/10/20 11:36 PM   Result Value Ref Range    NT pro-BNP 2,233 (H) <450 PG/ML       Imaging Juan Carlos Dawson /Studies:  Xr Chest Port    Result Date: 3/10/2020  IMPRESSION: Findings suspect for mild CHF or fluid overload. Assessment and Plan:     Principal Problem:    Atrial fibrillation with RVR ([address] (3/11/2020)    New onset. IV Cardizem gtt, TTE. Cardiology consult. Lovenox. Active Problems:    Palpitations (3/11/2020)    Per above. HTN (hypertension) (3/11/2020)    Stable. Continue home meds. DVT Prophylaxis: Lovenox      Code Status: FULL CODE      Disposition: Admit to remote [facility] for evaluation and treatment as per above.       Anticipated discharge: 2-3 days     Signed By: Zach Avitia MD     March 11, 2020

## 2020-03-11 NOTE — PROGRESS NOTES
Care Management Interventions  PCP Verified by CM: Yes  Mode of Transport at Discharge: Other (see comment)  Transition of Care Consult (CM Consult): Other  Current Support Network: Lives with Spouse  Confirm Follow Up Transport: Family  The Patient and/or Patient Representative was Provided with a Choice of Provider and Agrees with the Discharge Plan?: Yes  Freedom of Choice List was Provided with Basic Dialogue that Supports the Patient's Individualized Plan of Care/Goals, Treatment Preferences and Shares the Quality Data Associated with the Providers?: Yes  Discharge Location  Discharge Placement: Home  Visited with pt regarding plans for discharge, pt lives at home w/ spouse, she is inde with ADL's, works parttime @ [employer], see Dr. Aiyana Townsend and has no concerns at this time. CM will continue to follow.

## 2020-03-11 NOTE — ED NOTES
TRANSFER - OUT REPORT:    Verbal report given to karla on Binh Age  being transferred to ICU for routine progression of care       Report consisted of patients Situation, Background, Assessment and   Recommendations(SBAR). Information from the following report(s) SBAR was reviewed with the receiving nurse. Lines:   Peripheral IV 03/11/20 Right Forearm (Active)   Site Assessment Clean, dry, & intact 3/11/2020 12:22 AM   Phlebitis Assessment 0 3/11/2020 12:22 AM   Infiltration Assessment 0 3/11/2020 12:22 AM   Dressing Status Clean, dry, & intact 3/11/2020 12:22 AM        Opportunity for questions and clarification was provided.       Patient transported with:   Registered Nurse

## 2020-03-11 NOTE — ED PROVIDER NOTES
49-year-old female with history of hypertension presents from [facility] with complaint of palpitations and elevated blood pressure that she first noted earlier today. Patient was instructed that she had elevated blood pressure and atrial fibrillation with RVR. Patient states that her Alfonso Lin has been racing on and off for the past few months\". That she is never been officially diagnosed with atrial fibrillation. Patient denies chest pain, shortness of breath, nausea, vomiting, fever, chills, leg swelling or pain, hemoptysis, productive cough, dizziness, weakness. The history is provided by the patient. No  was used. Palpitations    This is a new problem. The current episode started 6 to 12 hours ago. The problem has not changed since onset. The problem occurs constantly. The problem is associated with nothing. Associated symptoms include irregular heartbeat. Pertinent negatives include no diaphoresis, no fever, no numbness, no chest pain, no chest pressure, no claudication, no near-syncope, no orthopnea, no PND, no syncope, no abdominal pain, no nausea, no vomiting, no headaches, no back pain, no leg pain, no lower extremity edema, no dizziness, no weakness, no cough, no hemoptysis, no shortness of breath and no sputum production. Risk factors include hypertension. She has tried nothing for the symptoms. The treatment provided no relief. Her past medical history is significant for hypertension. Hypertension    Associated symptoms include palpitations. Pertinent negatives include no chest pain, no orthopnea, no PND, no headaches, no dizziness, no shortness of breath, no nausea and no vomiting. Past Medical History:   Diagnosis Date    HTN (hypertension)        Past Surgical History:   Procedure Laterality Date    HX BREAST BIOPSY Left          History reviewed. No pertinent family history.     Social History     Socioeconomic History    Marital status:      Spouse name: Not on file    Number of children: Not on file    Years of education: Not on file    Highest education level: Not on file   Occupational History    Not on file   Social Needs    Financial resource strain: Not on file    Food insecurity:     Worry: Not on file     Inability: Not on file    Transportation needs:     Medical: Not on file     Non-medical: Not on file   Tobacco Use    Smoking status: Never Smoker    Smokeless tobacco: Never Used   Substance and Sexual Activity    Alcohol use: Never     Frequency: Never    Drug use: Never    Sexual activity: Not on file   Lifestyle    Physical activity:     Days per week: Not on file     Minutes per session: Not on file    Stress: Not on file   Relationships    Social connections:     Talks on phone: Not on file     Gets together: Not on file     Attends Jainism service: Not on file     Active member of club or organization: Not on file     Attends meetings of clubs or organizations: Not on file     Relationship status: Not on file    Intimate partner violence:     Fear of current or ex partner: Not on file     Emotionally abused: Not on file     Physically abused: Not on file     Forced sexual activity: Not on file   Other Topics Concern    Not on file   Social History Narrative    Not on file         ALLERGIES: Patient has no known allergies. Review of Systems   Constitutional: Negative for diaphoresis and fever. Respiratory: Negative for cough, hemoptysis, sputum production and shortness of breath. Cardiovascular: Positive for palpitations. Negative for chest pain, orthopnea, claudication, syncope, PND and near-syncope. Gastrointestinal: Negative for abdominal pain, nausea and vomiting. Musculoskeletal: Negative for back pain. Neurological: Negative for dizziness, weakness, numbness and headaches.        Vitals:    03/10/20 2313 03/10/20 2327   BP: (!) 174/100    Pulse: (!) 138 (!) 161   Resp: 18 18   Temp: 97.8 °F (36.6 °C)    SpO2: 97% 95%   Weight: 77.1 kg (170 lb)    Height: 5' 5\" (1.651 m)             Physical Exam  Vitals signs and nursing note reviewed. Constitutional:       Appearance: Normal appearance. Comments: Patient sitting up in bed in no acute distress. HENT:      Head: Normocephalic. Nose: Nose normal.      Mouth/Throat:      Mouth: Mucous membranes are moist.   Eyes:      Pupils: Pupils are equal, round, and reactive to light. Neck:      Comments: No JVD. Cardiovascular:      Rate and Rhythm: Tachycardia present. Rhythm irregular. Pulses: Normal pulses. Heart sounds: Normal heart sounds. Pulmonary:      Effort: Pulmonary effort is normal.      Breath sounds: Normal breath sounds. Abdominal:      Palpations: Abdomen is soft. Tenderness: There is no abdominal tenderness. Musculoskeletal:      Right lower leg: No edema. Left lower leg: No edema. Comments: No significant LE edema. No calf TTP. Skin:     Findings: No erythema. Neurological:      General: No focal deficit present. Mental Status: She is alert and oriented to person, place, and time. Motor: No weakness. Comments: Strength 5 out of 5 throughout. Normal sensory exam.           MDM  Number of Diagnoses or Management Options  Atrial fibrillation with RVR [facility]): new and requires workup  Diagnosis management comments: EKG with heart rate in the 140s to 150s w/ eidence of A. fib with RVR. Order placed for diltiazem 20 mg IV. Lab work-up pending. Hospitalist consulted for admission.  ======================================  Pro-BNP >2,000. Order placed for Lasix.           Amount and/or Complexity of Data Reviewed  Clinical lab tests: ordered and reviewed  Tests in the radiology section of CPT®: ordered and reviewed  Tests in the medicine section of CPT®: ordered and reviewed  Review and summarize past medical records: yes  Discuss the patient with other providers: yes  Independent visualization of images, tracings, or specimens: yes    Risk of Complications, Morbidity, and/or Mortality  Presenting problems: moderate  Diagnostic procedures: moderate  Management options: moderate    Patient Progress  Patient progress: stable    ED Course as of Mar 11 0028   Wed Mar 11, 2020   0003 CXR IMPRESSION: Findings suspect for mild CHF or fluid overload. [DF]      ED Course User Index  [DF] Obed Julien MD       EKG  Date/Time: 3/10/2020 11:47 PM  Performed by: Obed Julien MD  Authorized by: Obed Julien MD     ECG reviewed by ED Physician in the absence of a cardiologist: yes    Rate:     ECG rate:  149    ECG rate assessment: tachycardic    Rhythm:     Rhythm: atrial fibrillation    Ectopy:     Ectopy: none    QRS:     QRS axis:  Normal    QRS intervals:  Normal  Conduction:     Conduction: normal    ST segments:     ST segments:  Normal  T waves:     T waves: normal              Results Include:    Recent Results (from the past 24 hour(s))   CBC WITH AUTOMATED DIFF    Collection Time: 03/10/20 11:36 PM   Result Value Ref Range    WBC 7.1 4.3 - 11.1 K/uL    RBC 4.18 4.05 - 5.2 M/uL    HGB 12.3 11.7 - 15.4 g/dL    HCT 36.9 35.8 - 46.3 %    MCV 88.3 79.6 - 97.8 FL    MCH 29.4 26.1 - 32.9 PG    MCHC 33.3 31.4 - 35.0 g/dL    RDW 14.3 11.9 - 14.6 %    PLATELET 629 008 - 271 K/uL    MPV 11.0 9.4 - 12.3 FL    ABSOLUTE NRBC 0.00 0.0 - 0.2 K/uL    DF AUTOMATED      NEUTROPHILS 68 43 - 78 %    LYMPHOCYTES 20 13 - 44 %    MONOCYTES 8 4.0 - 12.0 %    EOSINOPHILS 3 0.5 - 7.8 %    BASOPHILS 1 0.0 - 2.0 %    IMMATURE GRANULOCYTES 0 0.0 - 5.0 %    ABS. NEUTROPHILS 4.9 1.7 - 8.2 K/UL    ABS. LYMPHOCYTES 1.4 0.5 - 4.6 K/UL    ABS. MONOCYTES 0.6 0.1 - 1.3 K/UL    ABS. EOSINOPHILS 0.2 0.0 - 0.8 K/UL    ABS. BASOPHILS 0.1 0.0 - 0.2 K/UL    ABS. IMM.  GRANS. 0.0 0.0 - 0.5 K/UL   METABOLIC PANEL, COMPREHENSIVE    Collection Time: 03/10/20 11:36 PM   Result Value Ref Range    Sodium 145 136 - 145 mmol/L Potassium 3.6 3.5 - 5.1 mmol/L    Chloride 111 (H) 98 - 107 mmol/L    CO2 27 21 - 32 mmol/L    Anion gap 7 7 - 16 mmol/L    Glucose 110 (H) 65 - 100 mg/dL    BUN 22 8 - 23 MG/DL    Creatinine 1.01 (H) 0.6 - 1.0 MG/DL    GFR est AA >60 >60 ml/min/1.73m2    GFR est non-AA 57 (L) >60 ml/min/1.73m2    Calcium 8.9 8.3 - 10.4 MG/DL    Bilirubin, total 0.6 0.2 - 1.1 MG/DL    ALT (SGPT) 63 12 - 65 U/L    AST (SGOT) 40 (H) 15 - 37 U/L    Alk. phosphatase 151 (H) 50 - 136 U/L    Protein, total 6.9 6.3 - 8.2 g/dL    Albumin 3.5 3.2 - 4.6 g/dL    Globulin 3.4 2.3 - 3.5 g/dL    A-G Ratio 1.0 (L) 1.2 - 3.5     TROPONIN I    Collection Time: 03/10/20 11:36 PM   Result Value Ref Range    Troponin-I, Qt. <0.02 (L) 0.02 - 0.05 NG/ML   MAGNESIUM    Collection Time: 03/10/20 11:36 PM   Result Value Ref Range    Magnesium 2.1 1.8 - 2.4 mg/dL   TSH 3RD GENERATION    Collection Time: 03/10/20 11:36 PM   Result Value Ref Range    TSH 2.460 uIU/mL   NT-PRO BNP    Collection Time: 03/10/20 11:36 PM   Result Value Ref Range    NT pro-BNP 2,233 (H) <450 PG/ML                  Gopal Sism MD; 3/10/2020 @11:45 PM Voice dictation software was used during the making of this note. This software is not perfect and grammatical and other typographical errors may be present.   This note has not been proofread for errors.  ===================================================================

## 2020-03-11 NOTE — PROGRESS NOTES
Patient fatiguing from getting up so often to void  Unable to get heart rate down. Placed a pure wick on patient and she is having trouble feeling comfortable to void in bed.   Much encouragement needed

## 2020-03-12 ENCOUNTER — HOSPITAL ENCOUNTER (OUTPATIENT)
Dept: CARDIAC CATH/INVASIVE PROCEDURES | Age: 75
Discharge: ANOTHER HOSPITAL | End: 2020-03-12
Attending: INTERNAL MEDICINE | Admitting: INTERNAL MEDICINE
Payer: MEDICARE

## 2020-03-12 VITALS — SYSTOLIC BLOOD PRESSURE: 114 MMHG | OXYGEN SATURATION: 87 % | DIASTOLIC BLOOD PRESSURE: 85 MMHG | HEART RATE: 73 BPM

## 2020-03-12 LAB
ANION GAP SERPL CALC-SCNC: 6 MMOL/L (ref 7–16)
ATRIAL RATE: 71 BPM
BUN SERPL-MCNC: 22 MG/DL (ref 8–23)
CALCIUM SERPL-MCNC: 8.6 MG/DL (ref 8.3–10.4)
CALCULATED P AXIS, ECG09: 72 DEGREES
CALCULATED R AXIS, ECG10: 16 DEGREES
CALCULATED T AXIS, ECG11: 10 DEGREES
CHLORIDE SERPL-SCNC: 104 MMOL/L (ref 98–107)
CO2 SERPL-SCNC: 30 MMOL/L (ref 21–32)
CREAT SERPL-MCNC: 0.9 MG/DL (ref 0.6–1)
DIAGNOSIS, 93000: NORMAL
GLUCOSE SERPL-MCNC: 153 MG/DL (ref 65–100)
MAGNESIUM SERPL-MCNC: 2.2 MG/DL (ref 1.8–2.4)
P-R INTERVAL, ECG05: 180 MS
POTASSIUM SERPL-SCNC: 3.4 MMOL/L (ref 3.5–5.1)
Q-T INTERVAL, ECG07: 458 MS
QRS DURATION, ECG06: 76 MS
QTC CALCULATION (BEZET), ECG08: 497 MS
SODIUM SERPL-SCNC: 140 MMOL/L (ref 136–145)
VENTRICULAR RATE, ECG03: 71 BPM

## 2020-03-12 PROCEDURE — 36415 COLL VENOUS BLD VENIPUNCTURE: CPT

## 2020-03-12 PROCEDURE — 93005 ELECTROCARDIOGRAM TRACING: CPT | Performed by: INTERNAL MEDICINE

## 2020-03-12 PROCEDURE — 99152 MOD SED SAME PHYS/QHP 5/>YRS: CPT

## 2020-03-12 PROCEDURE — 74011250637 HC RX REV CODE- 250/637: Performed by: FAMILY MEDICINE

## 2020-03-12 PROCEDURE — 74011250636 HC RX REV CODE- 250/636: Performed by: FAMILY MEDICINE

## 2020-03-12 PROCEDURE — 74011250636 HC RX REV CODE- 250/636: Performed by: INTERNAL MEDICINE

## 2020-03-12 PROCEDURE — 74011250637 HC RX REV CODE- 250/637: Performed by: HOSPITALIST

## 2020-03-12 PROCEDURE — 92960 CARDIOVERSION ELECTRIC EXT: CPT

## 2020-03-12 PROCEDURE — 83735 ASSAY OF MAGNESIUM: CPT

## 2020-03-12 PROCEDURE — 80048 BASIC METABOLIC PNL TOTAL CA: CPT

## 2020-03-12 PROCEDURE — 77030009397 HC ELECTRD ECG PACE ZOLL -B

## 2020-03-12 PROCEDURE — 65610000001 HC ROOM ICU GENERAL

## 2020-03-12 PROCEDURE — 93312 ECHO TRANSESOPHAGEAL: CPT

## 2020-03-12 PROCEDURE — 99153 MOD SED SAME PHYS/QHP EA: CPT

## 2020-03-12 PROCEDURE — 74011000258 HC RX REV CODE- 258: Performed by: INTERNAL MEDICINE

## 2020-03-12 PROCEDURE — 74011250637 HC RX REV CODE- 250/637: Performed by: INTERNAL MEDICINE

## 2020-03-12 RX ORDER — LISINOPRIL 20 MG/1
40 TABLET ORAL DAILY
Status: CANCELLED | OUTPATIENT
Start: 2020-03-13

## 2020-03-12 RX ORDER — SODIUM CHLORIDE 0.9 % (FLUSH) 0.9 %
5-40 SYRINGE (ML) INJECTION EVERY 8 HOURS
Status: CANCELLED | OUTPATIENT
Start: 2020-03-12

## 2020-03-12 RX ORDER — POTASSIUM CHLORIDE 20 MEQ/1
40 TABLET, EXTENDED RELEASE ORAL 2 TIMES DAILY
Status: DISCONTINUED | OUTPATIENT
Start: 2020-03-12 | End: 2020-03-13 | Stop reason: HOSPADM

## 2020-03-12 RX ORDER — ADHESIVE BANDAGE
30 BANDAGE TOPICAL DAILY PRN
Status: CANCELLED | OUTPATIENT
Start: 2020-03-12

## 2020-03-12 RX ORDER — ADHESIVE BANDAGE
30 BANDAGE TOPICAL DAILY PRN
Status: DISCONTINUED | OUTPATIENT
Start: 2020-03-12 | End: 2020-03-13 | Stop reason: HOSPADM

## 2020-03-12 RX ORDER — SODIUM CHLORIDE 0.9 % (FLUSH) 0.9 %
5-40 SYRINGE (ML) INJECTION AS NEEDED
Status: CANCELLED | OUTPATIENT
Start: 2020-03-12

## 2020-03-12 RX ORDER — AMIODARONE HYDROCHLORIDE 200 MG/1
200 TABLET ORAL 2 TIMES DAILY
Status: DISCONTINUED | OUTPATIENT
Start: 2020-03-13 | End: 2020-03-13 | Stop reason: HOSPADM

## 2020-03-12 RX ORDER — FENTANYL CITRATE 50 UG/ML
25-50 INJECTION, SOLUTION INTRAMUSCULAR; INTRAVENOUS
Status: DISCONTINUED | OUTPATIENT
Start: 2020-03-12 | End: 2020-03-12 | Stop reason: HOSPADM

## 2020-03-12 RX ORDER — ACETAMINOPHEN 325 MG/1
650 TABLET ORAL
Status: CANCELLED | OUTPATIENT
Start: 2020-03-12

## 2020-03-12 RX ORDER — POTASSIUM CHLORIDE 20 MEQ/1
40 TABLET, EXTENDED RELEASE ORAL 2 TIMES DAILY
Status: CANCELLED | OUTPATIENT
Start: 2020-03-12

## 2020-03-12 RX ORDER — AMIODARONE HYDROCHLORIDE 150 MG/3ML
300 INJECTION, SOLUTION INTRAVENOUS
Status: DISCONTINUED | OUTPATIENT
Start: 2020-03-12 | End: 2020-03-12 | Stop reason: HOSPADM

## 2020-03-12 RX ORDER — POTASSIUM CHLORIDE 20 MEQ/1
40 TABLET, EXTENDED RELEASE ORAL 2 TIMES DAILY
Status: DISCONTINUED | OUTPATIENT
Start: 2020-03-12 | End: 2020-03-12 | Stop reason: HOSPADM

## 2020-03-12 RX ORDER — MIDAZOLAM HYDROCHLORIDE 1 MG/ML
.5-2 INJECTION, SOLUTION INTRAMUSCULAR; INTRAVENOUS
Status: DISCONTINUED | OUTPATIENT
Start: 2020-03-12 | End: 2020-03-12 | Stop reason: HOSPADM

## 2020-03-12 RX ORDER — ACETAMINOPHEN 325 MG/1
650 TABLET ORAL
Status: DISCONTINUED | OUTPATIENT
Start: 2020-03-12 | End: 2020-03-13 | Stop reason: HOSPADM

## 2020-03-12 RX ORDER — AMIODARONE HYDROCHLORIDE 200 MG/1
200 TABLET ORAL DAILY
Status: DISCONTINUED | OUTPATIENT
Start: 2020-03-27 | End: 2020-03-13 | Stop reason: HOSPADM

## 2020-03-12 RX ORDER — LIDOCAINE HYDROCHLORIDE 20 MG/ML
15 SOLUTION OROPHARYNGEAL AS NEEDED
Status: DISCONTINUED | OUTPATIENT
Start: 2020-03-12 | End: 2020-03-12 | Stop reason: HOSPADM

## 2020-03-12 RX ORDER — LISINOPRIL 20 MG/1
40 TABLET ORAL DAILY
Status: DISCONTINUED | OUTPATIENT
Start: 2020-03-13 | End: 2020-03-13 | Stop reason: HOSPADM

## 2020-03-12 RX ADMIN — ACETAMINOPHEN 650 MG: 325 TABLET, FILM COATED ORAL at 08:37

## 2020-03-12 RX ADMIN — MIDAZOLAM 1 MG: 1 INJECTION INTRAMUSCULAR; INTRAVENOUS at 14:43

## 2020-03-12 RX ADMIN — AMIODARONE HYDROCHLORIDE 300 MG: 50 INJECTION, SOLUTION INTRAVENOUS at 14:45

## 2020-03-12 RX ADMIN — SODIUM CHLORIDE 20 MG/HR: 900 INJECTION, SOLUTION INTRAVENOUS at 04:13

## 2020-03-12 RX ADMIN — APIXABAN 5 MG: 5 TABLET, FILM COATED ORAL at 21:31

## 2020-03-12 RX ADMIN — LISINOPRIL 40 MG: 20 TABLET ORAL at 08:34

## 2020-03-12 RX ADMIN — HYALURONIDASE (HUMAN RECOMBINANT) 15 UNITS: 150 INJECTION, SOLUTION SUBCUTANEOUS at 21:00

## 2020-03-12 RX ADMIN — AMIODARONE HYDROCHLORIDE 1 MG/MIN: 50 INJECTION, SOLUTION INTRAVENOUS at 15:13

## 2020-03-12 RX ADMIN — MIDAZOLAM 2 MG: 1 INJECTION INTRAMUSCULAR; INTRAVENOUS at 14:35

## 2020-03-12 RX ADMIN — Medication 10 ML: at 06:03

## 2020-03-12 RX ADMIN — AMIODARONE HYDROCHLORIDE 0.5 MG/MIN: 50 INJECTION, SOLUTION INTRAVENOUS at 23:21

## 2020-03-12 RX ADMIN — FENTANYL CITRATE 50 MCG: 0.05 INJECTION, SOLUTION INTRAMUSCULAR; INTRAVENOUS at 14:30

## 2020-03-12 RX ADMIN — POTASSIUM CHLORIDE 40 MEQ: 20 TABLET, EXTENDED RELEASE ORAL at 08:34

## 2020-03-12 RX ADMIN — SODIUM CHLORIDE 20 MG/HR: 900 INJECTION, SOLUTION INTRAVENOUS at 10:36

## 2020-03-12 RX ADMIN — APIXABAN 5 MG: 5 TABLET, FILM COATED ORAL at 10:35

## 2020-03-12 RX ADMIN — MIDAZOLAM 2 MG: 1 INJECTION INTRAMUSCULAR; INTRAVENOUS at 14:30

## 2020-03-12 RX ADMIN — POTASSIUM CHLORIDE 40 MEQ: 20 TABLET, EXTENDED RELEASE ORAL at 21:31

## 2020-03-12 NOTE — PROGRESS NOTES
Report received from June for continue of care post LUCI. Patient resting quietly at this time.  Will monitor until returned to [facility]

## 2020-03-12 NOTE — PROGRESS NOTES
Hospitalist Note     Admit Date:  3/10/2020 11:19 PM   Name:  Paula Wick   Age:  66 y.o.  :  1945   MRN:  [id number]   PCP:  Ajay Welch MD  Treatment Team: Attending Provider: Hamzah Pompa MD; Consulting Provider: Sudheer Mcdonough MD; Care Manager: Chris Saldivar, CASSY; Consulting Provider: Glenna Kanner, MD; Primary Nurse: Ce Ramos; Utilization Review: Cristian Mercer RN    HPI/Subjective:   Mrs. Ashley Lobato is a nice 67 y/o WF with a h/o HTN who was admitted on 3/11 with new onset AFib RVR. Started on Cardizem drip and Eliquis. Cardiology consulted. 3/12: Still in AFib, rates low 100s. Cardizem @ 20/hr. No SOB, palpitations or chest pain. Plans for transfer downtown for LUCI/CV this afternoon.     No other complaints  Objective:     Patient Vitals for the past 24 hrs:   Temp Pulse Resp BP SpO2   20 0759  95      20 0716 97.7 °F (36.5 °C)       20 0712  95 (!) 44 103/72 (!) 83 %   20 0630  88 11 98/75 90 %   20 0149 98 °F (36.7 °C) 94 16 106/74 94 %   20 1900 98.1 °F (36.7 °C) 95 18 100/74 94 %   20 1830  96 19 93/75 94 %   20 1800  (!) 119 15 92/56 92 %   20 1730  89 21 99/67 94 %   20 1700  100 15 106/78 93 %   20 1631  92 14  94 %   20 1630  85 15 109/72 91 %   20 1600 97.6 °F (36.4 °C) 91 22 102/81 92 %   20 1530  (!) 120 21 100/71 95 %   20 1500  100 18 104/76 94 %   20 1430  (!) 115 17 (!) 76/59 93 %   20 1426  (!) 126 25  93 %   20 1424  90 29  92 %   20 1400  (!) 104 17 92/58    20 1359  (!) 109 17  91 %   20 1344  96 16 92/71 90 %   20 1259  (!) 116 28  93 %   20 1256  (!) 122 25 119/86 91 %   20 1200  (!) 110      20 1142 97.8 °F (36.6 °C) (!) 117 21 119/86 93 %   20 1029  (!) 133 25 114/68 93 %     Oxygen Therapy  O2 Sat (%): (!) 83 % (20 0712)  Pulse via Oximetry: 97 beats per minute (03/12/20 0712)  O2 Device: Room air (03/11/20 0710)    Estimated body mass index is 28.11 kg/m² as calculated from the following:    Height as of this encounter: 5' 5\" (1.651 m). Weight as of this encounter: 76.6 kg (168 lb 14.4 oz). Intake/Output Summary (Last 24 hours) at 3/12/2020 0835  Last data filed at 3/11/2020 1834  Gross per 24 hour   Intake    Output 400 ml   Net -400 ml       *Note that automatically entered I/Os may not be accurate; dependent on patient compliance with collection and accurate  by techs. General:    Well nourished. Alert. CV:   Irreg irreg, tachycardia. No murmur, rub, or gallop. Lungs:   CTAB. No wheezing, rhonchi, or rales. Abdomen:   Soft, nontender, nondistended. Extremities: Warm and dry. No cyanosis or edema. Skin:     No rashes or jaundice.    Neuro:  No gross focal deficits    Data Review:  I have reviewed all labs, meds, and studies from the last 24 hours:    Recent Results (from the past 24 hour(s))   METABOLIC PANEL, BASIC    Collection Time: 03/12/20  3:10 AM   Result Value Ref Range    Sodium 140 136 - 145 mmol/L    Potassium 3.4 (L) 3.5 - 5.1 mmol/L    Chloride 104 98 - 107 mmol/L    CO2 30 21 - 32 mmol/L    Anion gap 6 (L) 7 - 16 mmol/L    Glucose 153 (H) 65 - 100 mg/dL    BUN 22 8 - 23 MG/DL    Creatinine 0.90 0.6 - 1.0 MG/DL    GFR est AA >60 >60 ml/min/1.73m2    GFR est non-AA >60 >60 ml/min/1.73m2    Calcium 8.6 8.3 - 10.4 MG/DL        All Micro Results     None          Current Meds:  Current Facility-Administered Medications   Medication Dose Route Frequency    potassium chloride (K-DUR, KLOR-CON) SR tablet 40 mEq  40 mEq Oral BID    lisinopriL (PRINIVIL, ZESTRIL) tablet 40 mg  40 mg Oral DAILY    sodium chloride (NS) flush 5-40 mL  5-40 mL IntraVENous Q8H    sodium chloride (NS) flush 5-40 mL  5-40 mL IntraVENous PRN    acetaminophen (TYLENOL) tablet 650 mg  650 mg Oral Q4H PRN    magnesium hydroxide (MILK OF MAGNESIA) 400 mg/5 mL oral suspension 30 mL  30 mL Oral DAILY PRN    dilTIAZem (CARDIZEM) 100 mg in 0.9% sodium chloride (MBP/ADV) 100 mL infusion  0-20 mg/hr IntraVENous TITRATE    apixaban (ELIQUIS) tablet 5 mg  5 mg Oral BID       Other Studies:  Results for orders placed or performed during the hospital encounter of 03/10/20   2D ECHO COMPLETE ADULT (TTE) W OR [address] [facility]  [address] [place], [address] [facility]  [phone]    Transthoracic Echocardiogram  2D, M-mode, Doppler, and Color Doppler    Patient: Brenda Martinez  MR #: [id number]  : 1945  Age: 66 years  Gender: Female  Study date: 11-Mar-2020  Account #: [de-identified]  Height: 65 in  Weight: 169.6 lb  BSA: 1.85 mï¾²  Status:Routine  Location: [facility]  BP: 122/ 79    Allergies: NO KNOWN ALLERGENS    Sonographer:  Michelle Carbone RDCS  Group:  [facility]  Referring Physician:  Lisa Blankenship MD  Reading Physician:  Bhavin Self MD    INDICATIONS: New onset afib. PROCEDURE: This was a routine study. A transthoracic echocardiogram was  performed. The study included complete 2D imaging, M-mode, complete spectral  Doppler, and color Doppler. Image quality was adequate. LEFT VENTRICLE: Size was normal. Systolic function was normal. Ejection  fraction was estimated in the range of 55 % to 60 %. There were no regional  wall motion abnormalities. Wall thickness was normal. The study was not  technically sufficient to allow evaluation of LV diastolic function. RIGHT VENTRICLE: The size was normal. Systolic function was normal. Estimated  peak pressure was in the range of 35-40 mmHg. LEFT ATRIUM: The atrium was markedly dilated. RIGHT ATRIUM: The atrium was mildly to moderately dilated. SYSTEMIC VEINS: IVC: The inferior vena cava was mildly dilated. The  respirophasic change in diameter was less than 50%.     AORTIC VALVE: The valve was structurally normal, tri-commissural. There was   no  evidence for stenosis. There was no insufficiency. MITRAL VALVE: There was mild annular calcification. There was no evidence for  stenosis. There was mild regurgitation. TRICUSPID VALVE: The valve structure was normal. There was no evidence for  stenosis. There was mild regurgitation. PULMONIC VALVE: The valve structure was normal. There was no evidence for  stenosis. There was no insufficiency. PERICARDIUM: There was no pericardial effusion. AORTA: The root exhibited normal size. SUMMARY:    -  Left ventricle: Systolic function was normal. Ejection fraction was  estimated in the range of 55 % to 60 %. There were no regional wall motion  abnormalities. -  Left atrium: The atrium was markedly dilated. -  Right atrium: The atrium was mildly to moderately dilated. -  Inferior vena cava, hepatic veins: The inferior vena cava was mildly  dilated. The respirophasic change in diameter was less than 50%. -  Mitral valve: There was mild annular calcification. There was mild  regurgitation.    -  Tricuspid valve: There was mild regurgitation. SYSTEM MEASUREMENT TABLES    2D mode  AoR Diam (2D): 3.4 cm  LA Dimension (2D): 3 cm  Left Atrium Systolic Volume Index; Method of Disks, Biplane; 2D mode;: 49.2  ml/m2  IVS/LVPW (2D): 1.1  IVSd (2D): 1 cm  LVIDd (2D): 3.9 cm  LVIDs (2D): 3 cm  LVOT Area (2D): 3.5 cm2  LVPWd (2D): 0.9 cm    Unspecified Scan Mode  Peak Grad; Mean; Antegrade Flow: 5 mm[Hg]  Vmax; Antegrade Flow: 116 cm/s  LVOT Diam: 2.1 cm    Prepared and signed by    Lata Rivera MD  Signed 11-Mar-2020 14:00:46         No results found.     Assessment and Plan:     Hospital Problems as of 3/12/2020 Never Reviewed          Codes Class Noted - Resolved POA    * (Principal) Atrial fibrillation with RVR ([address] ICD-10-CM: I48.91  ICD-9-CM: 427.31  3/11/2020 - Present Yes        HTN (hypertension) ICD-10-CM: I10  ICD-9-CM: 401.9  3/11/2020 - Present Yes Palpitations ICD-10-CM: R00.2  ICD-9-CM: 785.1  3/11/2020 - Present Yes              Plan:  # AFib RVR              - CHADSVASc at least 3. Eliquis, cardizem drip              - TSH normal.    - TTE -- HAMMAD, normal EF, mild MR/TR   - Appreciate cardiology. LUCI/CV today. - Con't lisinopril    # HypoK   - Replace PO. Add on Mg (normal on admission, repeat now).    # HTN              - Con't lisinopril    DC planning/Dispo: Home when able.   Diet:  DIET NPO  DVT ppx: Eliquis    Signed:  Lauro Jones MD

## 2020-03-12 NOTE — PROGRESS NOTES
[facility] CARDIOLOGY PROGRESS NOTE           3/12/2020 4:25 PM    Admit Date: 3/10/2020      Subjective:     No o/e; s/p DCCV    ROS:  Cardiovascular:  As noted above    Objective:      Vitals:    03/12/20 1200 03/12/20 1202 03/12/20 1820 03/12/20 1823   BP:  118/75 117/60    Pulse: (!) 122 (!) 122  71   Resp:  (!) 50  14   Temp:       SpO2:  (!) 87%  93%   Weight:       Height:           Physical Exam:  General-No Acute Distress  Neck- supple, no JVD  CV- regular rate and rhythm no MRG  Lung- clear bilaterally  Abd- soft, nontender, nondistended  Ext- no edema bilaterally. Skin- warm and dry    Data Review:   Recent Labs     03/12/20  0310 03/11/20  0438 03/10/20  2336    141 145   K 3.4* 3.1* 3.6   MG 2.2  --  2.1   BUN 22 19 22   CREA 0.90 0.94 1.01*   * 145* 110*   WBC  --  7.9 7.1   HGB  --  12.8 12.3   HCT  --  38.6 36.9   PLT  --  244 243   TROIQ  --   --  <0.02*       Assessment/Plan:     Principal Problem:    Atrial fibrillation with RVR ([address] (3/11/2020)    Active Problems:    HTN (hypertension) (3/11/2020)      Palpitations (3/11/2020)    Underwent successful cardioversion (second attempt post amiodarone IV bolus); started on amiodarone drip post.  Echo with preserved EF and severe left atrial enlargement. Discussed less likely to maintain sinus rhythm without antiarrhythmics. Initiated amiodarone. Risk/benefits/alternatives discussed. Check TFTs/LFTs; plan PFTs. Okay to discharge in a.m. from a cardiac standpoint with planned p.o. amiodarone 200 mg twice daily for 2 weeks and then 200 mg daily; stop amiodarone drip in a.m. Continue long-term Eliquis at 5 mg twice daily. CHADVASc score of 4.   Replete potassium    Duncan Le MD  3/12/2020 4:25 PM

## 2020-03-12 NOTE — PROGRESS NOTES
Saw pt in interdisciplinarily rounds with the following disciplines: Physician, Case Management, Nursing, Dietary,Therapy and Pharmacy. The plan of care was discussed along with discharge date/ location. Pt to transfer DT for a cardioversion and then to return. D/c home in 1-2 days.

## 2020-03-12 NOTE — PROGRESS NOTES
Patient resting quietly in bed with eyes closed. Eyes open spontaneously, pupils equal and reactive, 3/brisk. She is A/O x 4. VS stable. HR 94-Afib. Respirations even and unlabored on room air, O2 sat 95%. Patient O2 sat drops during sleep to 86%, placed on 2L nasal cannula during sleep. Lung sounds clear throughout. Abdomen soft, non-tender with active bowel tones throughout. No skin issues noted. Patient voiding via purewick, urine clear/yellow, patent and draining. Palpated equal radial and pedal pulses bilaterally. No edema noted. Patient currently denies pain. Patient on cardizem gtt @ 20mg/hr, peripheral IV asymptomatic and infusing without difficulty. No other needs voiced. Bed low/locked, call light within reach. Instructed to call with further needs.

## 2020-03-12 NOTE — PROGRESS NOTES
Here for CVN/LUCI. A&O w/ no complaints. IV site red and swollen on arrival to unit. D/C IV to right upper arm. Quinten De Jesus

## 2020-03-12 NOTE — PROGRESS NOTES
Problem: Atrial fibrillation disease management teaching  Goal: *Able to cope  3/12/2020 0216 by Yaz Waldron RN  Outcome: Progressing Towards Goal  3/12/2020 0215 by Yaz Waldron RN  Reactivated

## 2020-03-12 NOTE — PROGRESS NOTES
LUCI/CVN by Dr Ny Larkin  II ASA II Mallampati  5mg versed  50mcg fentanyl  Viscous Solution given at 1420  2 shocks at 200 joules synced  Post cardioversion rhythm NSR w/ PAC's  Pt tolerated well.

## 2020-03-12 NOTE — PROGRESS NOTES
Patient placed on 2 liters NC as she is gradually desaturating into the low 80's No true apnea but slows resp rate to 6 breaths per minute and then back up to 12. Patient is asleep.

## 2020-03-12 NOTE — INTERDISCIPLINARY ROUNDS
Interdisciplinary team rounds were held 3/12/2020 with the following team members:Care Management, Nursing, Nutrition, Pastoral Care, Physical Therapy, Physician, Respiratory Therapy and Clinical Coordinator and the patient. Plan of care discussed. See clinical pathway and/or care plan for interventions and desired outcomes.

## 2020-03-12 NOTE — PROGRESS NOTES
Bedside and Verbal shift change report given to Gregory Fonseca RN  (oncoming nurse) by Dipti Cueto RN  (offgoing nurse). Report included the following information SBAR, Kardex, ED Summary, Procedure Summary, Intake/Output, Recent Results, Cardiac Rhythm afib and Alarm Parameters .

## 2020-03-12 NOTE — PROGRESS NOTES
TRANSFER - IN REPORT:    Verbal report received from [address], RN(name) on Halie Galan  being received from Cath lab(unit) for routine progression of care      Report consisted of patients Situation, Background, Assessment and   Recommendations(SBAR). Information from the following report(s) Procedure Summary was reviewed with the receiving nurse. Opportunity for questions and clarification was provided. Assessment completed upon patients arrival to unit and care assumed.

## 2020-03-12 NOTE — PROGRESS NOTES
[facility] ambulance notified for patient return to [facility]

## 2020-03-12 NOTE — PROGRESS NOTES
Pt back from cath lab after ordered cardioversion and LUCI. Able to transfer with assistance of 1 person from EMS stretcher to the bed.   Amiodorone drip continued at 1mg/1min

## 2020-03-12 NOTE — PROGRESS NOTES
Report called to Sebastián De La Torre RN for continue of care post LUCI/CVN. Patient remains alert with no complaints. Dr Raina Bahena at bedside at this time.

## 2020-03-12 NOTE — PROGRESS NOTES
Patient returned to [address] by [facility] ambulance service. Patient alert with no complaints.

## 2020-03-13 VITALS
DIASTOLIC BLOOD PRESSURE: 79 MMHG | HEIGHT: 65 IN | WEIGHT: 170.1 LBS | SYSTOLIC BLOOD PRESSURE: 118 MMHG | RESPIRATION RATE: 18 BRPM | BODY MASS INDEX: 28.34 KG/M2 | TEMPERATURE: 98.1 F | HEART RATE: 71 BPM | OXYGEN SATURATION: 97 %

## 2020-03-13 PROBLEM — R00.2 PALPITATIONS: Status: RESOLVED | Noted: 2020-03-11 | Resolved: 2020-03-13

## 2020-03-13 PROBLEM — I48.91 ATRIAL FIBRILLATION WITH RVR (HCC): Status: RESOLVED | Noted: 2020-03-11 | Resolved: 2020-03-13

## 2020-03-13 PROBLEM — I51.7 LEFT ATRIAL ENLARGEMENT: Status: ACTIVE | Noted: 2020-03-13

## 2020-03-13 PROBLEM — E87.6 HYPOKALEMIA: Status: ACTIVE | Noted: 2020-03-13

## 2020-03-13 PROBLEM — E87.6 HYPOKALEMIA: Status: RESOLVED | Noted: 2020-03-13 | Resolved: 2020-03-13

## 2020-03-13 PROCEDURE — 74011250637 HC RX REV CODE- 250/637: Performed by: INTERNAL MEDICINE

## 2020-03-13 PROCEDURE — 74011250637 HC RX REV CODE- 250/637: Performed by: FAMILY MEDICINE

## 2020-03-13 RX ORDER — DOCUSATE SODIUM 100 MG/1
100 CAPSULE, LIQUID FILLED ORAL DAILY
Status: DISCONTINUED | OUTPATIENT
Start: 2020-03-13 | End: 2020-03-13 | Stop reason: HOSPADM

## 2020-03-13 RX ORDER — AMIODARONE HYDROCHLORIDE 200 MG/1
200 TABLET ORAL SEE ADMIN INSTRUCTIONS
Qty: 60 TAB | Refills: 0 | Status: SHIPPED | OUTPATIENT
Start: 2020-03-13 | End: 2020-03-31

## 2020-03-13 RX ADMIN — DOCUSATE SODIUM 100 MG: 100 CAPSULE, LIQUID FILLED ORAL at 08:15

## 2020-03-13 RX ADMIN — APIXABAN 5 MG: 5 TABLET, FILM COATED ORAL at 08:01

## 2020-03-13 RX ADMIN — LISINOPRIL 40 MG: 20 TABLET ORAL at 08:01

## 2020-03-13 RX ADMIN — POTASSIUM CHLORIDE 40 MEQ: 20 TABLET, EXTENDED RELEASE ORAL at 08:01

## 2020-03-13 RX ADMIN — AMIODARONE HYDROCHLORIDE 200 MG: 200 TABLET ORAL at 08:01

## 2020-03-13 NOTE — PROGRESS NOTES
Bedside, Verbal and Written shift change report given to Josef Whitaker (oncoming nurse) by Rio (offgoing nurse). Report included the following information SBAR, Kardex, ED Summary, Intake/Output, MAR and Cardiac Rhythm Sinus Rhythm with PAC's.

## 2020-03-13 NOTE — DISCHARGE SUMMARY
Hospitalist Discharge Summary     Admit Date:  3/10/2020 11:19 PM   Name:  Ayla Cobb   Age:  66 y.o.  :  1945   MRN:  [id number]   PCP:  Tigre Quiles MD  Treatment Team: Attending Provider: Hal Loo MD; Consulting Provider: Charisma Boogie MD; Care Manager: Carolee Jnoes RN; Consulting Provider: Inna Mirza MD; Utilization Review: Rafael Ivan RN; Primary Nurse: John Munoz RN; Primary Nurse: Bela Mills    Problem List for this Hospitalization:  Hospital Problems as of 3/13/2020 Date Reviewed: 3/13/2020          Codes Class Noted - Resolved POA    Left atrial enlargement ICD-10-CM: I51.7  ICD-9-CM: 429.3  3/13/2020 - Present Yes        HTN (hypertension) ICD-10-CM: I10  ICD-9-CM: 401.9  3/11/2020 - Present Yes        RESOLVED: Hypokalemia ICD-10-CM: E87.6  ICD-9-CM: 276.8  3/13/2020 - 3/13/2020 Yes        * (Principal) RESOLVED: Atrial fibrillation with RVR ([address] ICD-10-CM: I48.91  ICD-9-CM: 427.31  3/11/2020 - 3/13/2020 Yes        RESOLVED: Palpitations ICD-10-CM: R00.2  ICD-9-CM: 785.1  3/11/2020 - 3/13/2020 Yes            Hospital Course:  Mrs. Grace Jansen is a very nice 65 y/o WF who was admitted to our service on 3/11 with new onset atrial fibrillation with RVR. She was started on amiodarone drip and Eliquis and Cardiology was consulted. Potassium was replaced. TTE showed preserved EF and significant left atrial enlargement. She was sent to [facility] on 3/12 and underwent LUCI/CV with Dr. Keyon Ramos. She converted to NSR and was sent back to [facility]. She was started on oral amiodarone and her IV drip was discontinued. Scripts for amiodarone and Eliquis provided. She needs PCP and Cardiology follow up. Her hospital course was otherwise and she is medically stable for discharge. Disposition: Home or Self Care  Activity: Activity as tolerated  Diet: DIET CARDIAC Regular  Code Status: Full Code    Follow Up Orders:   Follow-up Appointments   Procedures    FOLLOW UP VISIT Appointment in: Other Quyen Tejada PCP [address] Cardiology     PCP   [address] Cardiology     Standing Status:   Standing     Number of Occurrences:   1     Order Specific Question:   Appointment in     Answer: Other (Specify)       Follow-up Information     Follow up With Specialties Details Why Contact Info    Tanvi Polk MD Geriatric Medicine In 1 week Hospital follow up. New onset AFib. [address]  [phone]      [facility] follow up, new onset AFib s/p cardioversion [address] [facility]  [phone]          Discharge meds at bottom of this note. Plan was discussed with patient, nursing, CM. All questions answered. Patient was stable at time of discharge. Patient will call a physician or return if any concerns. Discharge summary and encounter summary was sent to PCP electronically via \"Comm Mgt\" link in [facility], if possible. Diagnostic Imaging/Tests:   Xr Chest Port    Result Date: 3/10/2020  EXAM: Chest x-ray. INDICATION: Palpitations. COMPARISON: None. TECHNIQUE: Frontal view chest x-ray. FINDINGS: The heart is borderline enlarged, with mild vascular congestion, small pleural effusions and mild bibasilar lung atelectasis. No pneumothorax is identified. IMPRESSION: Findings suspect for mild CHF or fluid overload.        Echocardiogram results:  Results for orders placed or performed during the hospital encounter of 03/10/20   2D ECHO COMPLETE ADULT (TTE) W OR [address] [facility] [address] [place] [address], [address] [facility]  [phone]    Transthoracic Echocardiogram  2D, M-mode, Doppler, and Color Doppler    Patient: Lizeth Nurse  MR #: [id number]  : 1945  Age: 66 years  Gender: Female  Study date: 11-Mar-2020  Account #: [de-identified]  Height: 65 in  Weight: 169.6 lb  BSA: 1.85 mï¾²  Status:Routine  Location: [facility]  BP: 122/ 79    Allergies: NO KNOWN ALLERGENS    Sonographer:  Meghana Mishra RDCS  Group:  [facility]  Referring Physician:  Rosie Junior MD  Reading Physician:  Maggie Dolan MD    INDICATIONS: New onset afib. PROCEDURE: This was a routine study. A transthoracic echocardiogram was  performed. The study included complete 2D imaging, M-mode, complete spectral  Doppler, and color Doppler. Image quality was adequate. LEFT VENTRICLE: Size was normal. Systolic function was normal. Ejection  fraction was estimated in the range of 55 % to 60 %. There were no regional  wall motion abnormalities. Wall thickness was normal. The study was not  technically sufficient to allow evaluation of LV diastolic function. RIGHT VENTRICLE: The size was normal. Systolic function was normal. Estimated  peak pressure was in the range of 35-40 mmHg. LEFT ATRIUM: The atrium was markedly dilated. RIGHT ATRIUM: The atrium was mildly to moderately dilated. SYSTEMIC VEINS: IVC: The inferior vena cava was mildly dilated. The  respirophasic change in diameter was less than 50%. AORTIC VALVE: The valve was structurally normal, tri-commissural. There was   no  evidence for stenosis. There was no insufficiency. MITRAL VALVE: There was mild annular calcification. There was no evidence for  stenosis. There was mild regurgitation. TRICUSPID VALVE: The valve structure was normal. There was no evidence for  stenosis. There was mild regurgitation. PULMONIC VALVE: The valve structure was normal. There was no evidence for  stenosis. There was no insufficiency. PERICARDIUM: There was no pericardial effusion. AORTA: The root exhibited normal size. SUMMARY:    -  Left ventricle: Systolic function was normal. Ejection fraction was  estimated in the range of 55 % to 60 %. There were no regional wall motion  abnormalities. -  Left atrium: The atrium was markedly dilated.     -  Right atrium: The atrium was mildly to moderately dilated. -  Inferior vena cava, hepatic veins: The inferior vena cava was mildly  dilated. The respirophasic change in diameter was less than 50%. -  Mitral valve: There was mild annular calcification. There was mild  regurgitation.    -  Tricuspid valve: There was mild regurgitation. SYSTEM MEASUREMENT TABLES    2D mode  AoR Diam (2D): 3.4 cm  LA Dimension (2D): 3 cm  Left Atrium Systolic Volume Index; Method of Disks, Biplane; 2D mode;: 49.2  ml/m2  IVS/LVPW (2D): 1.1  IVSd (2D): 1 cm  LVIDd (2D): 3.9 cm  LVIDs (2D): 3 cm  LVOT Area (2D): 3.5 cm2  LVPWd (2D): 0.9 cm    Unspecified Scan Mode  Peak Grad; Mean; Antegrade Flow: 5 mm[Hg]  Vmax;  Antegrade Flow: 116 cm/s  LVOT Diam: 2.1 cm    Prepared and signed by    Melissa Villareal MD  Signed 11-Mar-2020 14:00:46         Procedures done this admission:  * No surgery found *    All Micro Results     None          Labs: Results:       BMP, Mg, Phos Recent Labs     03/12/20  0310 03/11/20  0438 03/10/20  2336    141 145   K 3.4* 3.1* 3.6    104 111*   CO2 30 28 27   AGAP 6* 9 7   BUN 22 19 22   CREA 0.90 0.94 1.01*   CA 8.6 8.8 8.9   * 145* 110*   MG 2.2  --  2.1      CBC Recent Labs     03/11/20  0438 03/10/20  2336   WBC 7.9 7.1   RBC 4.39 4.18   HGB 12.8 12.3   HCT 38.6 36.9    243   GRANS 75 68   LYMPH 13 20   EOS 2 3   MONOS 10 8   BASOS 1 1   IG 0 0   ANEU 5.9 4.9   ABL 1.0 1.4   ANNA 0.2 0.2   ABM 0.8 0.6   ABB 0.1 0.1   AIG 0.0 0.0      LFT Recent Labs     03/10/20  2336   SGOT 40*   ALT 63   *   TP 6.9   ALB 3.5   GLOB 3.4   AGRAT 1.0*      Cardiac Testing Lab Results   Component Value Date/Time    Troponin-I, Qt. <0.02 (L) 03/10/2020 11:36 PM      Coagulation Tests No results found for: PTP, INR, APTT, INREXT   A1c No results found for: HBA1C, HGBE8, XSB5FQXB   Lipid Panel No results found for: CHOL, CHOLPOCT, CHOLX, CHLST, CHOLV, 638659, HDL, HDLP, LDL, LDLC, DLDLP, 294276, VLDLC, VLDL, TGLX, TRIGL, TRIGP, [facility], [facility], [facility]   Thyroid Panel Lab Results   Component Value Date/Time    TSH 2.460 03/10/2020 11:36 PM        Most Recent UA No results found for: COLOR, APPRN, REFSG, LEX, PROTU, GLUCU, KETU, BILU, BLDU, UROU, CATARINO, LEUKU, WBCU, RBCU, UEPI, BACTU, CASTS, UCRY, MUCUS, UCOM     No Known Allergies    There is no immunization history on file for this patient. All Labs from Last 24 Hrs:  Recent Results (from the past 24 hour(s))   EKG, 12 LEAD, INITIAL    Collection Time: 03/12/20  2:59 PM   Result Value Ref Range    Ventricular Rate 71 BPM    Atrial Rate 71 BPM    P-R Interval 180 ms    QRS Duration 76 ms    Q-T Interval 458 ms    QTC Calculation (Bezet) 497 ms    Calculated P Axis 72 degrees    Calculated R Axis 16 degrees    Calculated T Axis 10 degrees    Diagnosis       Sinus rhythm with Premature atrial complexes  Prolonged QT  Nonspecific ST abnormality  Abnormal ECG  When compared with ECG of 10-MAR-2020 23:26,  Sinus rhythm has replaced Atrial fibrillation  Vent.  rate has decreased BY  78 BPM  Minimal criteria for Anterior infarct are no longer Present  Confirmed by [facility]  MD ()PRATEEK ([id number]) on 3/12/2020 3:48:24 PM         Current Med List in Hospital:   Current Facility-Administered Medications   Medication Dose Route Frequency    docusate sodium (COLACE) capsule 100 mg  100 mg Oral DAILY    acetaminophen (TYLENOL) tablet 650 mg  650 mg Oral Q4H PRN    magnesium hydroxide (MILK OF MAGNESIA) 400 mg/5 mL oral suspension 30 mL  30 mL Oral DAILY PRN    apixaban (ELIQUIS) tablet 5 mg  5 mg Oral BID    lisinopriL (PRINIVIL, ZESTRIL) tablet 40 mg  40 mg Oral DAILY    potassium chloride (K-DUR, KLOR-CON) SR tablet 40 mEq  40 mEq Oral BID    amiodarone (CORDARONE) 450 mg in dextrose 5% 250 mL infusion  0.5-1 mg/min IntraVENous TITRATE    amiodarone (CORDARONE) tablet 200 mg  200 mg Oral BID    [START ON 3/27/2020] amiodarone (CORDARONE) tablet 200 mg  200 mg Oral DAILY       Discharge Exam:  Patient Vitals for the past 24 hrs:   Temp Pulse Resp BP SpO2   03/13/20 0801  63  116/71    03/13/20 0723  72 (!) 35 107/73 95 %   03/13/20 0708  (!) 55 (!) 34 116/74 93 %   03/13/20 0656 98.1 °F (36.7 °C) 64 24 123/71 98 %   03/13/20 0652  (!) 55 23 123/71 93 %   03/13/20 0637  (!) 59 (!) 34 113/68 94 %   03/13/20 0622  (!) 59 17 111/60 92 %   03/13/20 0607  61 24 115/64 94 %   03/13/20 0552  63 15 122/74 94 %   03/13/20 0537  87 22 109/65 92 %   03/13/20 0522  (!) 56 23 111/64 93 %   03/13/20 0507  64 (!) 44 109/68 96 %   03/13/20 0452  65 (!) 47 120/70 95 %   03/13/20 0437  61 29 121/71 97 %   03/13/20 0422  65 17 118/65 99 %   03/13/20 0410 98.6 °F (37 °C)       03/13/20 0407  (!) 57 (!) 32 117/62 95 %   03/13/20 0400  65      03/13/20 0352  72 (!) 34 108/70 94 %   03/13/20 0348  75 (!) 44 126/67 96 %   03/13/20 0322  61 19 107/68 94 %   03/13/20 0307  60 20 110/65 93 %   03/13/20 0252  61 17 116/70 95 %   03/13/20 0237  67 19 110/67 93 %   03/13/20 0222  (!) 58 20 113/67 93 %   03/13/20 0207  67 20 103/62 (!) 89 %   03/13/20 0152  69 20 112/67 (!) 89 %   03/13/20 0137  64 18 116/65 90 %   03/13/20 0122  63 16 116/62 (!) 89 %   03/13/20 0107  71 20 106/64 92 %   03/13/20 0052  74 22 111/62 92 %   03/13/20 0037  70 28 103/61 92 %   03/13/20 0022  62 28 120/65 92 %   03/13/20 0007  64 18 118/68 93 %   03/12/20 2352 97.1 °F (36.2 °C) 68 20 121/68 96 %   03/12/20 2252  70 22 118/74 94 %   03/12/20 2237  72 25 114/63 93 %   03/12/20 2222  74 23 113/63 96 %   03/12/20 2207  75 24 109/67 94 %   03/12/20 2152  69 24 119/66 97 %   03/12/20 2140  72 23 118/65 97 %   03/12/20 2132  78 (!) 34 121/73 (!) 89 %   03/12/20 2045  72 20 116/63 90 %   03/12/20 2030  69 20 114/62 91 %   03/12/20 2015 98.4 °F (36.9 °C) 63 19 112/65 91 %   03/12/20 2000  88 (!) 53 127/69 95 %   03/12/20 1945  72 (!) 60 118/59 94 %   03/12/20 1930  73 (!) 55 115/62 95 %   03/12/20 1915  76 (!) 71 112/60 95 %   03/12/20 1900  72 (!) 61 112/70 98 %   03/12/20 1845  74 (!) 37 111/57 94 %   03/12/20 1823  71 14  93 %   03/12/20 1820    117/60    03/12/20 1202  (!) 122 (!) 50 118/75 (!) 87 %   03/12/20 1200  (!) 122      03/12/20 1132  89 16 104/70 97 %   03/12/20 1103 97.6 °F (36.4 °C)       03/12/20 1102  (!) 105 12 119/71 97 %   03/12/20 1032  99 8 122/64 96 %   03/12/20 1014  (!) 113 23 103/74 93 %   03/12/20 0834  (!) 101  103/72      Oxygen Therapy  O2 Sat (%): 95 % (03/13/20 0723)  Pulse via Oximetry: 66 beats per minute (03/13/20 0723)  O2 Device: Room air(2L NC with sleeping) (03/13/20 0710)  O2 Flow Rate (L/min): 2 l/min(when sleeping ) (03/12/20 0730)    Estimated body mass index is 28.31 kg/m² as calculated from the following:    Height as of this encounter: 5' 5\" (1.651 m). Weight as of this encounter: 77.2 kg (170 lb 1.6 oz). Intake/Output Summary (Last 24 hours) at 3/13/2020 0816  Last data filed at 3/13/2020 8546  Gross per 24 hour   Intake 362 ml   Output    Net 362 ml       *Note that automatically entered I/Os may not be accurate; dependent on patient compliance with collection and accurate  by assistants. General:    Well nourished. Alert. Eyes:   Normal sclerae. Extraocular movements intact. ENT:  Normocephalic, atraumatic. Moist mucous membranes  CV:   Regular rate and rhythm. No murmur, rub, or gallop. Lungs:  Clear to auscultation bilaterally. No wheezing, rhonchi, or rales. Abdomen: Soft, nontender, nondistended. Extremities: Warm and dry. No cyanosis or edema. Neurologic: CN II-XII grossly intact. No gross focal deficits   Skin:     No rashes or jaundice. Psych:  Normal mood and affect. Discharge Info:   Current Discharge Medication List      START taking these medications    Details   apixaban (ELIQUIS) 5 mg tablet Take 1 Tab by mouth two (2) times a day.   Qty: 60 Tab, Refills: 0      amiodarone (CORDARONE) 200 mg tablet Take 1 Tab by mouth See Admin Instructions for 28 doses. Take 2 pills twice daily for 14 days; then take 1 pill daily afterwards  Qty: 60 Tab, Refills: 0         CONTINUE these medications which have NOT CHANGED    Details   lisinopriL (PRINIVIL, ZESTRIL) 40 mg tablet Take 40 mg by mouth. Time spent in patient discharge planning and coordination 35 minutes.     Signed:  Forbes Riedel, MD

## 2020-03-13 NOTE — PROCEDURES
[facility]  PROCEDURE NOTE    Name:  Homa Palacios  MR#:  [id number]  :  1945  ACCOUNT #:  [de-identified]  DATE OF SERVICE:  2020    PREOPERATIVE DIAGNOSIS: Atrial fibrillation. POSTOPERATIVE DIAGNOSIS: Sinus rhythm. PROCEDURE PERFORMED:  Cardioversion. SURGEON:  Luis Antonio Zaldivar M.D. INDICATION:  Symptomatic atrial fibrillation. PROCEDURE IN DETAIL:  After informed consent was obtained the patient was brought to the prep and recovery area and subsequently hooked up to Zoll pads. She then underwent a transesophageal echo to rule out the presence of a left atrial appendageal clot. After adequate sedation, the patient had her rhythm subsequently synched and was shocked with 200 joules of biphasic energy which was initially successful on the first attempt but then had to be re-attempted. 300 mg bolus of amiodarone was given prior. The patient was shocked once again at 200 joules of biphasic energy which was successful on the second attempt. There was a short run of accelerated junctional rhythm where the patient was predominantly in sinus rhythm. The patient underwent conscious/moderate sedation for a planned LUCI/cardioversion. A total of 5 mg of Versed and 50 mcg of fentanyl was used. Initiation time was 02:25 p.m. with end time at 02:49 p.m. RN present was Tiffany Tariq. There were no complications during the procedure. CONCLUSION:  Successful cardioversion at 200 joules of biphasic energy which was successful on the second attempt. The patient will be initiated on an amiodarone drip with subsequent plan for an oral load.       Kj Salas MD      [id number]  D:  2020 16:14  T:  2020 3:51  JOB #:  [id number]

## 2020-03-13 NOTE — PROGRESS NOTES
Shift Assessment:  Pt alert and oriented x4, calm and cooperative with care, perrla 3 mm brisk, joshua to command, pt denies any pain at this time. HR 60's, sinus rhythm with pac's noted, sbp 110's, radial and pedal pulses 2+, no edema noted. Resp: lungs auscultated clear throughout, pt O2 sat 91% at this time on room air. Bowel tones active, no flatus or BM witnessed at this time. Pt voids via BSC large clear yellow. Skin is c/d/i, no issues noted. PIV to left AC patent and intact. Pt noted with previous PIV site to left AC red and inflammed with hardened area red swollen and pt c/o discomfort to area upon palpation. Pt family at bedside, all questions asked and answered, pt positioned for comfort, call light placed within reach.

## 2020-03-13 NOTE — PROGRESS NOTES
Bedside and Verbal shift change report given to [address] Gomes RN  (oncoming nurse) by Anthony Haskins RN  (offgoing nurse). Report included the following information SBAR, Kardex, ED Summary, Procedure Summary, Intake/Output, Recent Results, Cardiac Rhythm NSR-SB  and Alarm Parameters .

## 2020-03-13 NOTE — PROGRESS NOTES
Problem: Falls - Risk of  Goal: *Absence of Falls  Description: Document Isi Culp Fall Risk and appropriate interventions in the flowsheet. Outcome: Progressing Towards Goal  Note: Fall Risk Interventions:  Mobility Interventions: Bed/chair exit alarm, Patient to call before getting OOB, Strengthening exercises (ROM-active/passive)         Medication Interventions: Bed/chair exit alarm, Evaluate medications/consider consulting pharmacy, Patient to call before getting OOB, Teach patient to arise slowly    Elimination Interventions: Bed/chair exit alarm, Call light in reach, Patient to call for help with toileting needs, Stay With Me (per policy), Toilet paper/wipes in reach, Toileting schedule/hourly rounds              Problem: Patient Education: Go to Patient Education Activity  Goal: Patient/Family Education  Outcome: Progressing Towards Goal     Problem: Pressure Injury - Risk of  Goal: *Prevention of pressure injury  Description: Document Donald Scale and appropriate interventions in the flowsheet.   Outcome: Progressing Towards Goal  Note: Pressure Injury Interventions:       Moisture Interventions: Apply protective barrier, creams and emollients, Check for incontinence Q2 hours and as needed, Maintain skin hydration (lotion/cream), Offer toileting Q_hr    Activity Interventions: Increase time out of bed, Pressure redistribution bed/mattress(bed type), PT/OT evaluation         Nutrition Interventions: Document food/fluid/supplement intake, Discuss nutritional consult with provider, Offer support with meals,snacks and hydration    Friction and Shear Interventions: Apply protective barrier, creams and emollients, HOB 30 degrees or less, Lift sheet                Problem: Patient Education: Go to Patient Education Activity  Goal: Patient/Family Education  Outcome: Progressing Towards Goal     Problem: Atrial fibrillation disease management teaching  Goal: *Able to cope  Outcome: Progressing Towards Goal

## 2020-03-13 NOTE — PROGRESS NOTES
IV:  PIV site to left AC hardened swollen area noted with discomfort to light palpation, pt states current medication running (amiodarone) was switched to other PIV AC site. Dr. Hansen He called and pt status discussed, order received for Hylenex in case of amiodarone infiltration. Dose discussed with pharmacy and policy reviewed, medication administered per protocol. Ice pack placed over patients arm for fifteen minutes after administration.

## 2020-03-13 NOTE — PROGRESS NOTES
Care Management Interventions  PCP Verified by CM: Yes  Palliative Care Criteria Met (RRAT>21 & CHF Dx)?: No(Risk 15% Dx Atrial Fib )  Mode of Transport at Discharge: Other (see comment)(daughter)  Transition of Care Consult (CM Consult): Other  Current Support Network: Lives with Spouse  Confirm Follow Up Transport: Family  The Patient and/or Patient Representative was Provided with a Choice of Provider and Agrees with the Discharge Plan?: Yes  Freedom of Choice List was Provided with Basic Dialogue that Supports the Patient's Individualized Plan of Care/Goals, Treatment Preferences and Shares the Quality Data Associated with the Providers?: Yes  Discharge Location  Discharge Placement: Home  Patient ready for d/c home today. Voices no concerns or needs for d/c. Patient to d/c home with family.

## 2020-03-13 NOTE — INTERDISCIPLINARY ROUNDS
Interdisciplinary team rounds were held 3/13/2020 with the following team members:Care Management, Nursing, Nutrition, Pastoral Care, Physical Therapy, Physician, Respiratory Therapy and Clinical Coordinator and the patient. Plan of care discussed. See clinical pathway and/or care plan for interventions and desired outcomes.

## 2020-03-13 NOTE — PROGRESS NOTES
Patient to be discharge to home, awaiting ride from spouse. Discharge, medication changes and RX, and follow up instructions given. Patient given opportunity to ask questions and answers provided. Handouts given on atrial fibrillation and blood thinner-eliquis. Peripheral IV discontinued. Care instructions provided for infiltrated IV in RAC. Patient belongings packed and to be sent home with patient. No other questions at this time. Again, awaiting arrival of transportation home.

## 2020-03-13 NOTE — PROGRESS NOTES
Problem: Atrial fibrillation disease management teaching  Goal: *Able to cope  3/13/2020 0846 by Martin Man  Outcome: Resolved/Met  3/13/2020 0834 by Martin Man  Outcome: Progressing Towards Goal     Problem: Falls - Risk of  Goal: *Absence of Falls  Description: Document Lazaro Fontenot Fall Risk and appropriate interventions in the flowsheet.   3/13/2020 0846 by Queenie IRBY  Outcome: Resolved/Met  Note: Fall Risk Interventions:  Mobility Interventions: Assess mobility with egress test, Bed/chair exit alarm, Communicate number of staff needed for ambulation/transfer, Patient to call before getting OOB, Strengthening exercises (ROM-active/passive)         Medication Interventions: Assess postural VS orthostatic hypotension, Bed/chair exit alarm, Evaluate medications/consider consulting pharmacy, Patient to call before getting OOB, Teach patient to arise slowly    Elimination Interventions: Call light in reach, Elevated toilet seat, Patient to call for help with toileting needs, Stay With Me (per policy), Toilet paper/wipes in reach           3/13/2020 0834 by Queenie IRBY  Outcome: Progressing Towards Goal  Note: Fall Risk Interventions:  Mobility Interventions: Assess mobility with egress test, Bed/chair exit alarm, Communicate number of staff needed for ambulation/transfer, Patient to call before getting OOB, Strengthening exercises (ROM-active/passive)         Medication Interventions: Assess postural VS orthostatic hypotension, Bed/chair exit alarm, Evaluate medications/consider consulting pharmacy, Patient to call before getting OOB, Teach patient to arise slowly    Elimination Interventions: Call light in reach, Elevated toilet seat, Patient to call for help with toileting needs, Stay With Me (per policy), Toilet paper/wipes in reach              Problem: Patient Education: Go to Patient Education Activity  Goal: Patient/Family Education  3/13/2020 0846 by Martin Man  Outcome: Resolved/Met  3/13/2020 0834 by Kenneth Ramos  Outcome: Progressing Towards Goal     Problem: Pressure Injury - Risk of  Goal: *Prevention of pressure injury  Description: Document Donald Scale and appropriate interventions in the flowsheet.   3/13/2020 0846 by Kenneth Ramos  Outcome: Resolved/Met  3/13/2020 0834 by Amber IRBY  Outcome: Progressing Towards Goal  Note: Pressure Injury Interventions:       Moisture Interventions: Absorbent underpads, Maintain skin hydration (lotion/cream), Minimize layers    Activity Interventions: Increase time out of bed, PT/OT evaluation    Mobility Interventions: PT/OT evaluation    Nutrition Interventions: Document food/fluid/supplement intake    Friction and Shear Interventions: Foam dressings/transparent film/skin sealants, Minimize layers                Problem: Patient Education: Go to Patient Education Activity  Goal: Patient/Family Education  3/13/2020 0846 by Kenneth Ramos  Outcome: Resolved/Met  3/13/2020 0834 by Amber IRBY  Outcome: Progressing Towards Goal

## 2020-03-13 NOTE — PROGRESS NOTES
End of shift note:  Pt neuro status unchanged this shift, pt continues to deny any pain. HR 50-70's, sinus rhythm with pac's, sbp 100-120's, radial and pedal pulses 2+, no edema noted. Resp: lungs auscultated clear throughout, pt placed on 2L NC with ) 2sat maitnained > 92% throughout this shift. Bowel tones active, no flatus or BM witnessed this shift. Pt voids via BSC large clear yellow urine. Skin noted with itching to anterior and posterior pad spots, lotion given. PIV to left AC patent and intact. Right AC site less red and swollen compared to beginning of shift, pt states pain with palpation improved and ice packs applied to site x2 this shift. Pt positioned for comfort and call light placed within reach.

## 2020-03-13 NOTE — DISCHARGE INSTRUCTIONS
12/17/19 1811: left message on cell phone to call back Atrial Fibrillation: Care Instructions  Your Care Instructions    Atrial fibrillation is an irregular and often fast heartbeat. Treating this condition is important for several reasons. It can cause blood clots, which can travel from your heart to your brain and cause a stroke. If you have a fast heartbeat, you may feel lightheaded, dizzy, and weak. An irregular heartbeat can also increase your risk for heart failure. Atrial fibrillation is often the result of another heart condition, such as high blood pressure or coronary artery disease. Making changes to improve your heart condition will help you stay healthy and active. Follow-up care is a key part of your treatment and safety. Be sure to make and go to all appointments, and call your doctor if you are having problems. It's also a good idea to know your test results and keep a list of the medicines you take. How can you care for yourself at home? Medicines    · Take your medicines exactly as prescribed. Call your doctor if you think you are having a problem with your medicine. You will get more details on the specific medicines your doctor prescribes.     · If your doctor has given you a blood thinner to prevent a stroke, be sure you get instructions about how to take your medicine safely. Blood thinners can cause serious bleeding problems.     · Do not take any vitamins, over-the-counter drugs, or herbal products without talking to your doctor first.    Lifestyle changes    · Do not smoke. Smoking can increase your chance of a stroke and heart attack. If you need help quitting, talk to your doctor about stop-smoking programs and medicines. These can increase your chances of quitting for good.     · Eat a heart-healthy diet.     · Stay at a healthy weight. Lose weight if you need to.     · Limit alcohol to 2 drinks a day for men and 1 drink a day for women. Too much alcohol can cause health problems.     · Avoid colds and flu.  Get a pneumococcal vaccine shot. If you have had one before, ask your doctor whether you need another dose. Get a flu shot every year. If you must be around people with colds or flu, wash your hands often. Activity    · If your doctor recommends it, get more exercise. Walking is a good choice. Bit by bit, increase the amount you walk every day. Try for at least 30 minutes on most days of the week. You also may want to swim, bike, or do other activities. Your doctor may suggest that you join a cardiac rehabilitation program so that you can have help increasing your physical activity safely.     · Start light exercise if your doctor says it is okay. Even a small amount will help you get stronger, have more energy, and manage stress. Walking is an easy way to get exercise. Start out by walking a little more than you did in the hospital. Gradually increase the amount you walk.     · When you exercise, watch for signs that your heart is working too hard. You are pushing too hard if you cannot talk while you are exercising. If you become short of breath or dizzy or have chest pain, sit down and rest immediately.     · Check your pulse regularly. Place two fingers on the artery at the palm side of your wrist, in line with your thumb. If your heartbeat seems uneven or fast, talk to your doctor. When should you call for help? Call 911 anytime you think you may need emergency care. For example, call if:    · You have symptoms of a heart attack. These may include:  ? Chest pain or pressure, or a strange feeling in the chest.  ? Sweating. ? Shortness of breath. ? Nausea or vomiting. ? Pain, pressure, or a strange feeling in the back, neck, jaw, or upper belly or in one or both shoulders or arms. ? Lightheadedness or sudden weakness. ? A fast or irregular heartbeat. After you call  911, the  may tell you to chew 1 adult-strength or 2 to 4 low-dose aspirin. Wait for an ambulance.  Do not try to drive yourself.     · You have symptoms of a stroke. These may include:  ? Sudden numbness, tingling, weakness, or loss of movement in your face, arm, or leg, especially on only one side of your body. ? Sudden vision changes. ? Sudden trouble speaking. ? Sudden confusion or trouble understanding simple statements. ? Sudden problems with walking or balance. ? A sudden, severe headache that is different from past headaches.     · You passed out (lost consciousness).    Call your doctor now or seek immediate medical care if:    · You have new or increased shortness of breath.     · You feel dizzy or lightheaded, or you feel like you may faint.     · Your heart rate becomes irregular.     · You can feel your heart flutter in your chest or skip heartbeats. Tell your doctor if these symptoms are new or worse.    Watch closely for changes in your health, and be sure to contact your doctor if you have any problems. Where can you learn more? Go to http://caity-jocelynn.info/  Enter U020 in the search box to learn more about \"Atrial Fibrillation: Care Instructions. \"  Current as of: December 15, 2019Content Version: 12.4  © 9884-0361 [employer]. Care instructions adapted under license by [employer] (which disclaims liability or warranty for this information). If you have questions about a medical condition or this instruction, always ask your healthcare professional. [place] [address] any warranty or liability for your use of this information. Patient Education        Atrial Fibrillation: Care Instructions  Your Care Instructions    Atrial fibrillation is an irregular and often fast heartbeat. Treating this condition is important for several reasons. It can cause blood clots, which can travel from your heart to your brain and cause a stroke. If you have a fast heartbeat, you may feel lightheaded, dizzy, and weak. An irregular heartbeat can also increase your risk for heart failure.   Atrial fibrillation is often the result of another heart condition, such as high blood pressure or coronary artery disease. Making changes to improve your heart condition will help you stay healthy and active. Follow-up care is a key part of your treatment and safety. Be sure to make and go to all appointments, and call your doctor if you are having problems. It's also a good idea to know your test results and keep a list of the medicines you take. How can you care for yourself at home? Medicines    · Take your medicines exactly as prescribed. Call your doctor if you think you are having a problem with your medicine. You will get more details on the specific medicines your doctor prescribes.     · If your doctor has given you a blood thinner to prevent a stroke, be sure you get instructions about how to take your medicine safely. Blood thinners can cause serious bleeding problems.     · Do not take any vitamins, over-the-counter drugs, or herbal products without talking to your doctor first.    Lifestyle changes    · Do not smoke. Smoking can increase your chance of a stroke and heart attack. If you need help quitting, talk to your doctor about stop-smoking programs and medicines. These can increase your chances of quitting for good.     · Eat a heart-healthy diet.     · Stay at a healthy weight. Lose weight if you need to.     · Limit alcohol to 2 drinks a day for men and 1 drink a day for women. Too much alcohol can cause health problems.     · Avoid colds and flu. Get a pneumococcal vaccine shot. If you have had one before, ask your doctor whether you need another dose. Get a flu shot every year. If you must be around people with colds or flu, wash your hands often. Activity    · If your doctor recommends it, get more exercise. Walking is a good choice. Bit by bit, increase the amount you walk every day. Try for at least 30 minutes on most days of the week. You also may want to swim, bike, or do other activities. Your doctor may suggest that you join a cardiac rehabilitation program so that you can have help increasing your physical activity safely.     · Start light exercise if your doctor says it is okay. Even a small amount will help you get stronger, have more energy, and manage stress. Walking is an easy way to get exercise. Start out by walking a little more than you did in the hospital. Gradually increase the amount you walk.     · When you exercise, watch for signs that your heart is working too hard. You are pushing too hard if you cannot talk while you are exercising. If you become short of breath or dizzy or have chest pain, sit down and rest immediately.     · Check your pulse regularly. Place two fingers on the artery at the palm side of your wrist, in line with your thumb. If your heartbeat seems uneven or fast, talk to your doctor. When should you call for help? Call 911 anytime you think you may need emergency care. For example, call if:    · You have symptoms of a heart attack. These may include:  ? Chest pain or pressure, or a strange feeling in the chest.  ? Sweating. ? Shortness of breath. ? Nausea or vomiting. ? Pain, pressure, or a strange feeling in the back, neck, jaw, or upper belly or in one or both shoulders or arms. ? Lightheadedness or sudden weakness. ? A fast or irregular heartbeat. After you call  911, the  may tell you to chew 1 adult-strength or 2 to 4 low-dose aspirin. Wait for an ambulance. Do not try to drive yourself.     · You have symptoms of a stroke. These may include:  ? Sudden numbness, tingling, weakness, or loss of movement in your face, arm, or leg, especially on only one side of your body. ? Sudden vision changes. ? Sudden trouble speaking. ? Sudden confusion or trouble understanding simple statements. ? Sudden problems with walking or balance. ? A sudden, severe headache that is different from past headaches.     · You passed out (lost consciousness).  Call your doctor now or seek immediate medical care if:    · You have new or increased shortness of breath.     · You feel dizzy or lightheaded, or you feel like you may faint.     · Your heart rate becomes irregular.     · You can feel your heart flutter in your chest or skip heartbeats. Tell your doctor if these symptoms are new or worse.    Watch closely for changes in your health, and be sure to contact your doctor if you have any problems. Where can you learn more? Go to http://caity-jocelynn.info/  Enter U020 in the search box to learn more about \"Atrial Fibrillation: Care Instructions. \"  Current as of: December 15, 2019Content Version: 12.4  © 1030-5443 [employer]. Care instructions adapted under license by [employer] (which disclaims liability or warranty for this information). If you have questions about a medical condition or this instruction, always ask your healthcare professional. [address] any warranty or liability for your use of this information.

## 2020-03-14 ENCOUNTER — APPOINTMENT (OUTPATIENT)
Dept: ULTRASOUND IMAGING | Age: 75
End: 2020-03-14
Attending: EMERGENCY MEDICINE
Payer: MEDICARE

## 2020-03-14 ENCOUNTER — HOSPITAL ENCOUNTER (EMERGENCY)
Age: 75
Discharge: HOME | End: 2020-03-14
Attending: EMERGENCY MEDICINE
Payer: MEDICARE

## 2020-03-14 VITALS
OXYGEN SATURATION: 99 % | SYSTOLIC BLOOD PRESSURE: 142 MMHG | HEART RATE: 78 BPM | HEIGHT: 65 IN | WEIGHT: 165 LBS | TEMPERATURE: 97.8 F | RESPIRATION RATE: 16 BRPM | DIASTOLIC BLOOD PRESSURE: 86 MMHG | BODY MASS INDEX: 27.49 KG/M2

## 2020-03-14 DIAGNOSIS — R60.9 PERIPHERAL EDEMA: Primary | ICD-10-CM

## 2020-03-14 DIAGNOSIS — L03.119 CELLULITIS OF UPPER EXTREMITY, UNSPECIFIED LATERALITY: ICD-10-CM

## 2020-03-14 PROCEDURE — 99283 EMERGENCY DEPT VISIT LOW MDM: CPT

## 2020-03-14 PROCEDURE — 93970 EXTREMITY STUDY: CPT

## 2020-03-14 RX ORDER — CEPHALEXIN 500 MG/1
500 CAPSULE ORAL 3 TIMES DAILY
Qty: 21 CAP | Refills: 0 | Status: SHIPPED | OUTPATIENT
Start: 2020-03-14 | End: 2020-03-21

## 2020-03-14 NOTE — ED PROVIDER NOTES
43-year-old white female admitted to the hospital 5 days ago due to A. fib. She was discharged home on amiodarone and Eliquis. While admitted she had a echo which showed normal EF. She states that while she was in the hospital she developed some pain and swelling to both of her feet. No calf pain. No fever. No chest pain or shortness of breath. She reports the swelling has worsened since leaving the hospital.    The history is provided by the patient. Foot Swelling    Pertinent negatives include no back pain and no neck pain. Past Medical History:   Diagnosis Date    HTN (hypertension)        Past Surgical History:   Procedure Laterality Date    HX BREAST BIOPSY Left          No family history on file.     Social History     Socioeconomic History    Marital status:      Spouse name: Not on file    Number of children: Not on file    Years of education: Not on file    Highest education level: Not on file   Occupational History    Not on file   Social Needs    Financial resource strain: Not on file    Food insecurity     Worry: Not on file     Inability: Not on file    Transportation needs     Medical: Not on file     Non-medical: Not on file   Tobacco Use    Smoking status: Never Smoker    Smokeless tobacco: Never Used   Substance and Sexual Activity    Alcohol use: Never     Frequency: Never    Drug use: Never    Sexual activity: Not on file   Lifestyle    Physical activity     Days per week: Not on file     Minutes per session: Not on file    Stress: Not on file   Relationships    Social connections     Talks on phone: Not on file     Gets together: Not on file     Attends Scientologist service: Not on file     Active member of club or organization: Not on file     Attends meetings of clubs or organizations: Not on file     Relationship status: Not on file    Intimate partner violence     Fear of current or ex partner: Not on file     Emotionally abused: Not on file Physically abused: Not on file     Forced sexual activity: Not on file   Other Topics Concern    Not on file   Social History Narrative    Not on file         ALLERGIES: Patient has no known allergies. Review of Systems   Constitutional: Negative for fever. HENT: Negative for congestion. Respiratory: Negative for cough and shortness of breath. Cardiovascular: Negative for chest pain. Gastrointestinal: Negative for abdominal pain and vomiting. Genitourinary: Negative for dysuria. Musculoskeletal: Negative for back pain and neck pain. Skin: Positive for rash. Neurological: Negative for headaches. Vitals:    03/14/20 1246   BP: 147/89   Pulse: 81   Resp: 16   Temp: 98.1 °F (36.7 °C)   SpO2: 97%   Weight: 74.8 kg (165 lb)   Height: 5' 5\" (1.651 m)            Physical Exam  Vitals signs and nursing note reviewed. Constitutional:       General: She is not in acute distress. Appearance: Normal appearance. HENT:      Head: Normocephalic and atraumatic. Nose: Nose normal.      Mouth/Throat:      Mouth: Mucous membranes are moist.      Pharynx: Oropharynx is clear. Eyes:      Extraocular Movements: Extraocular movements intact. Conjunctiva/sclera: Conjunctivae normal.      Pupils: Pupils are equal, round, and reactive to light. Neck:      Musculoskeletal: Normal range of motion and neck supple. Cardiovascular:      Rate and Rhythm: Normal rate. Rhythm irregular. Pulmonary:      Effort: Pulmonary effort is normal.      Breath sounds: Normal breath sounds. No wheezing. Abdominal:      Palpations: Abdomen is soft. Tenderness: There is no abdominal tenderness. Musculoskeletal: Normal range of motion. Comments: Symmetric swelling to both feet. Swelling does not extend up the legs. No calf pain. Both antecubital areas have induration and erythema consistent with cellulitis   Skin:     General: Skin is warm and dry. Findings: Erythema present. Neurological:      Mental Status: She is alert and oriented to person, place, and time. Psychiatric:         Mood and Affect: Mood normal.         Behavior: Behavior normal.          MDM  Number of Diagnoses or Management Options  Diagnosis management comments: Ultrasound of lower extremities showed no evidence of DVT. Patient has had lab work and cardiac echo within the past 48 hours. This is ruled out renal disease, liver disease and heart failure. Etiology for her swelling not clear but at this time does not appear to be related to blood clot or other conditions mentioned above. Her arms do have erythema concerning for cellulitis. Will treat with Keflex. She is advised to elevate her feet and wear compression stockings.     Risk of Complications, Morbidity, and/or Mortality  Presenting problems: low  Diagnostic procedures: low  Management options: low           Procedures

## 2020-03-14 NOTE — DISCHARGE INSTRUCTIONS
Patient Education        Cellulitis: Care Instructions  Your Care Instructions    Cellulitis is a skin infection caused by bacteria, most often strep or staph. It often occurs after a break in the skin from a scrape, cut, bite, or puncture, or after a rash. Cellulitis may be treated without doing tests to find out what caused it. But your doctor may do tests, if needed, to look for a specific bacteria, like methicillin-resistant Staphylococcus aureus (MRSA). The doctor has checked you carefully, but problems can develop later. If you notice any problems or new symptoms, get medical treatment right away. Follow-up care is a key part of your treatment and safety. Be sure to make and go to all appointments, and call your doctor if you are having problems. It's also a good idea to know your test results and keep a list of the medicines you take. How can you care for yourself at home? · Take your antibiotics as directed. Do not stop taking them just because you feel better. You need to take the full course of antibiotics. · Prop up the infected area on pillows to reduce pain and swelling. Try to keep the area above the level of your heart as often as you can. · If your doctor told you how to care for your wound, follow your doctor's instructions. If you did not get instructions, follow this general advice:  ? Wash the wound with clean water 2 times a day. Don't use hydrogen peroxide or alcohol, which can slow healing. ? You may cover the wound with a thin layer of petroleum jelly, such as Vaseline, and a nonstick bandage. ? Apply more petroleum jelly and replace the bandage as needed. · Be safe with medicines. Take pain medicines exactly as directed. ? If the doctor gave you a prescription medicine for pain, take it as prescribed. ? If you are not taking a prescription pain medicine, ask your doctor if you can take an over-the-counter medicine.   To prevent cellulitis in the future  · Try to prevent cuts, scrapes, or other injuries to your skin. Cellulitis most often occurs where there is a break in the skin. · If you get a scrape, cut, mild burn, or bite, wash the wound with clean water as soon as you can to help avoid infection. Don't use hydrogen peroxide or alcohol, which can slow healing. · If you have swelling in your legs (edema), support stockings and good skin care may help prevent leg sores and cellulitis. · Take care of your feet, especially if you have diabetes or other conditions that increase the risk of infection. Wear shoes and socks. Do not go barefoot. If you have athlete's foot or other skin problems on your feet, talk to your doctor about how to treat them. When should you call for help? Call your doctor now or seek immediate medical care if:    · You have signs that your infection is getting worse, such as:  ? Increased pain, swelling, warmth, or redness. ? Red streaks leading from the area. ? Pus draining from the area. ? A fever.     · You get a rash.    Watch closely for changes in your health, and be sure to contact your doctor if:    · You do not get better as expected. Where can you learn more? Go to http://caity-jocelynn.info/  Enter X309 in the search box to learn more about \"Cellulitis: Care Instructions. \"  Current as of: October 30, 2019Content Version: 12.4  © 6035-2400 Healthwise, Incorporated. Care instructions adapted under license by [employer] (which disclaims liability or warranty for this information). If you have questions about a medical condition or this instruction, always ask your healthcare professional. [place] [address] any warranty or liability for your use of this information. Patient Education        Leg and Ankle Edema: Care Instructions  Your Care Instructions  Swelling in the legs, ankles, and feet is called edema. It is common after you sit or stand for a while.  Long plane flights or car rides often cause swelling in the legs and feet. You may also have swelling if you have to stand for long periods of time at your job. Problems with the veins in the legs (varicose veins) and changes in hormones can also cause swelling. Sometimes the swelling in the ankles and feet is caused by a more serious problem, such as heart failure, infection, blood clots, or liver or kidney disease. Follow-up care is a key part of your treatment and safety. Be sure to make and go to all appointments, and call your doctor if you are having problems. It's also a good idea to know your test results and keep a list of the medicines you take. How can you care for yourself at home? · If your doctor gave you medicine, take it as prescribed. Call your doctor if you think you are having a problem with your medicine. · Whenever you are resting, raise your legs up. Try to keep the swollen area higher than the level of your heart. · Take breaks from standing or sitting in one position. ? Walk around to increase the blood flow in your lower legs. ? Move your feet and ankles often while you stand, or tighten and relax your leg muscles. · Wear support stockings. Put them on in the morning, before swelling gets worse. · Eat a balanced diet. Lose weight if you need to. · Limit the amount of salt (sodium) in your diet. Salt holds fluid in the body and may increase swelling. When should you call for help? Call 911 anytime you think you may need emergency care. For example, call if:    · You have symptoms of a blood clot in your lung (called a pulmonary embolism). These may include:  ? Sudden chest pain. ? Trouble breathing. ? Coughing up blood.    Call your doctor now or seek immediate medical care if:    · You have signs of a blood clot, such as:  ? Pain in your calf, back of the knee, thigh, or groin. ?  Redness and swelling in your leg or groin.     · You have symptoms of infection, such as:  ? Increased pain, swelling, warmth, or redness. ? Red streaks or pus. ? A fever.    Watch closely for changes in your health, and be sure to contact your doctor if:    · Your swelling is getting worse.     · You have new or worsening pain in your legs.     · You do not get better as expected. Where can you learn more? Go to http://caity-jocelynn.info/  Enter T955 in the search box to learn more about \"Leg and Ankle Edema: Care Instructions. \"  Current as of: June 26, 2019Content Version: 12.4  © 1519-0236 Healthwise, Incorporated. Care instructions adapted under license by [employer] (which disclaims liability or warranty for this information). If you have questions about a medical condition or this instruction, always ask your healthcare professional. [address] any warranty or liability for your use of this information.

## 2020-03-14 NOTE — ED TRIAGE NOTES
Patient was recent discharged from hospital after irregular heart beat and enlarged heart. Patient advises started with feet pain yesterday and now with swelling present. Patient denies any pain at this time while sitting and denies any shortness of breath. Patient was placed on amiodarone and eliquis.

## 2020-03-14 NOTE — ED NOTES
I have reviewed discharge instructions with the patient. The patient verbalized understanding. Patient left ED via Discharge Method: wheelchair to Home with . Opportunity for questions and clarification provided. Patient given 1 scripts. To continue your aftercare when you leave the hospital, you may receive an automated call from our care team to check in on how you are doing. This is a free service and part of our promise to provide the best care and service to meet your aftercare needs.  If you have questions, or wish to unsubscribe from this service please call [phone]. Thank you for Choosing our [facility] Emergency Department.

## 2020-10-14 ENCOUNTER — HOSPITAL ENCOUNTER (OUTPATIENT)
Dept: MAMMOGRAPHY | Age: 75
Discharge: HOME | End: 2020-10-14
Attending: INTERNAL MEDICINE
Payer: MEDICARE

## 2020-10-14 DIAGNOSIS — M85.80 OSTEOPENIA: ICD-10-CM

## 2020-10-14 DIAGNOSIS — Z12.31 VISIT FOR SCREENING MAMMOGRAM: ICD-10-CM

## 2020-10-14 PROCEDURE — 77080 DXA BONE DENSITY AXIAL: CPT

## 2020-10-14 PROCEDURE — 77067 SCR MAMMO BI INCL CAD: CPT

## 2020-11-18 ENCOUNTER — HOSPITAL ENCOUNTER (OUTPATIENT)
Dept: MAMMOGRAPHY | Age: 75
Discharge: HOME | End: 2020-11-18
Attending: INTERNAL MEDICINE
Payer: MEDICARE

## 2020-11-18 DIAGNOSIS — R92.8 ABNORMAL SCREENING MAMMOGRAM: ICD-10-CM

## 2020-11-18 PROCEDURE — 76642 ULTRASOUND BREAST LIMITED: CPT

## 2020-11-18 PROCEDURE — 77065 DX MAMMO INCL CAD UNI: CPT

## 2021-06-04 ENCOUNTER — HOSPITAL ENCOUNTER (EMERGENCY)
Age: 76
Discharge: HOME | End: 2021-06-04
Attending: EMERGENCY MEDICINE
Payer: MEDICARE

## 2021-06-04 ENCOUNTER — APPOINTMENT (OUTPATIENT)
Dept: GENERAL RADIOLOGY | Age: 76
End: 2021-06-04
Attending: EMERGENCY MEDICINE
Payer: MEDICARE

## 2021-06-04 VITALS
TEMPERATURE: 97.5 F | HEART RATE: 64 BPM | HEIGHT: 65 IN | BODY MASS INDEX: 25.34 KG/M2 | SYSTOLIC BLOOD PRESSURE: 122 MMHG | WEIGHT: 152.12 LBS | DIASTOLIC BLOOD PRESSURE: 66 MMHG | RESPIRATION RATE: 16 BRPM | OXYGEN SATURATION: 95 %

## 2021-06-04 DIAGNOSIS — I48.0 PAROXYSMAL ATRIAL FIBRILLATION (HCC): Primary | ICD-10-CM

## 2021-06-04 LAB
ANION GAP SERPL CALC-SCNC: 6 MMOL/L (ref 7–16)
ATRIAL RATE: 75 BPM
BACTERIA URNS QL MICRO: NORMAL /HPF
BASOPHILS # BLD: 0.1 K/UL (ref 0–0.2)
BASOPHILS NFR BLD: 1 % (ref 0–2)
BUN SERPL-MCNC: 25 MG/DL (ref 8–23)
CALCIUM SERPL-MCNC: 9.7 MG/DL (ref 8.3–10.4)
CALCULATED P AXIS, ECG09: 41 DEGREES
CALCULATED R AXIS, ECG10: 37 DEGREES
CALCULATED T AXIS, ECG11: 55 DEGREES
CASTS URNS QL MICRO: 0 /LPF
CHLORIDE SERPL-SCNC: 107 MMOL/L (ref 98–107)
CO2 SERPL-SCNC: 29 MMOL/L (ref 21–32)
CREAT SERPL-MCNC: 0.96 MG/DL (ref 0.6–1)
CRYSTALS URNS QL MICRO: 0 /LPF
DIAGNOSIS, 93000: NORMAL
DIFFERENTIAL METHOD BLD: ABNORMAL
EOSINOPHIL # BLD: 0 K/UL (ref 0–0.8)
EOSINOPHIL NFR BLD: 1 % (ref 0.5–7.8)
EPI CELLS #/AREA URNS HPF: NORMAL /HPF
ERYTHROCYTE [DISTWIDTH] IN BLOOD BY AUTOMATED COUNT: 13.2 % (ref 11.9–14.6)
GLUCOSE SERPL-MCNC: 171 MG/DL (ref 65–100)
HCT VFR BLD AUTO: 43.9 % (ref 35.8–46.3)
HGB BLD-MCNC: 14.6 G/DL (ref 11.7–15.4)
IMM GRANULOCYTES # BLD AUTO: 0 K/UL (ref 0–0.5)
IMM GRANULOCYTES NFR BLD AUTO: 0 % (ref 0–5)
LYMPHOCYTES # BLD: 0.8 K/UL (ref 0.5–4.6)
LYMPHOCYTES NFR BLD: 12 % (ref 13–44)
MAGNESIUM SERPL-MCNC: 2.5 MG/DL (ref 1.8–2.4)
MCH RBC QN AUTO: 29.8 PG (ref 26.1–32.9)
MCHC RBC AUTO-ENTMCNC: 33.3 G/DL (ref 31.4–35)
MCV RBC AUTO: 89.6 FL (ref 79.6–97.8)
MONOCYTES # BLD: 0.4 K/UL (ref 0.1–1.3)
MONOCYTES NFR BLD: 6 % (ref 4–12)
MUCOUS THREADS URNS QL MICRO: 0 /LPF
NEUTS SEG # BLD: 5.4 K/UL (ref 1.7–8.2)
NEUTS SEG NFR BLD: 81 % (ref 43–78)
NRBC # BLD: 0 K/UL (ref 0–0.2)
P-R INTERVAL, ECG05: 138 MS
PLATELET # BLD AUTO: 303 K/UL (ref 150–450)
PMV BLD AUTO: 10.5 FL (ref 9.4–12.3)
POTASSIUM SERPL-SCNC: 3.9 MMOL/L (ref 3.5–5.1)
Q-T INTERVAL, ECG07: 390 MS
QRS DURATION, ECG06: 78 MS
QTC CALCULATION (BEZET), ECG08: 435 MS
RBC # BLD AUTO: 4.9 M/UL (ref 4.05–5.2)
RBC #/AREA URNS HPF: 0 /HPF
SODIUM SERPL-SCNC: 142 MMOL/L (ref 136–145)
TROPONIN-HIGH SENSITIVITY: 8.2 PG/ML (ref 0–14)
TROPONIN-HIGH SENSITIVITY: 9.4 PG/ML (ref 0–14)
VENTRICULAR RATE, ECG03: 75 BPM
WBC # BLD AUTO: 6.6 K/UL (ref 4.3–11.1)
WBC URNS QL MICRO: NORMAL /HPF

## 2021-06-04 PROCEDURE — 85025 COMPLETE CBC W/AUTO DIFF WBC: CPT

## 2021-06-04 PROCEDURE — 93005 ELECTROCARDIOGRAM TRACING: CPT | Performed by: EMERGENCY MEDICINE

## 2021-06-04 PROCEDURE — 80048 BASIC METABOLIC PNL TOTAL CA: CPT

## 2021-06-04 PROCEDURE — 81003 URINALYSIS AUTO W/O SCOPE: CPT

## 2021-06-04 PROCEDURE — 84484 ASSAY OF TROPONIN QUANT: CPT

## 2021-06-04 PROCEDURE — 71045 X-RAY EXAM CHEST 1 VIEW: CPT

## 2021-06-04 PROCEDURE — 87086 URINE CULTURE/COLONY COUNT: CPT

## 2021-06-04 PROCEDURE — 81015 MICROSCOPIC EXAM OF URINE: CPT

## 2021-06-04 PROCEDURE — 99285 EMERGENCY DEPT VISIT HI MDM: CPT

## 2021-06-04 PROCEDURE — 83735 ASSAY OF MAGNESIUM: CPT

## 2021-06-04 RX ORDER — ERGOCALCIFEROL 1.25 MG/1
50000 CAPSULE ORAL DAILY
COMMUNITY
End: 2021-08-02

## 2021-06-04 NOTE — ED TRIAGE NOTES
Pt states that she woke up this morning and felt very weak and fatigued. Was seen and treated at her PMD and she was hypotensive and appeared to be in sinus tach with the EKG. States that she has a hx of A-fib.   Masked on arrival

## 2021-06-04 NOTE — ED PROVIDER NOTES
40-year-old female presents with complaints of low blood pressure and generalized weakness  Patient reports that she awoke this morning felt generally fatigued and weak to the point where she found it difficult to get out of bed and eventually just went back to sleep. She has not had episodes similar to this in the past.  She denies any chest pain or shortness of breath. She has had no recent nausea vomiting or diarrhea. No fevers or chills    Patient's past medical history is significant for A. Fib. Patient was seen at urgent care earlier this afternoon. EKG there showed rapid A. fib with a rate around 140. Blood pressure at that time was 88/64 patient was not hypoxic and not febrile    The history is provided by the patient and the spouse. Hypotension   This is a new problem. The current episode started 6 to 12 hours ago. The problem has been resolved. Associated symptoms include weakness. Pertinent negatives include no confusion, no seizures, no delusions and no hallucinations. Mental status baseline is normal.  Risk factors: Patient states that she is very cautious with taking her medications accurately. No other risk factors for the symptoms. Her past medical history is significant for hypertension and heart disease. Her past medical history does not include seizures, dementia or head trauma. Past medical history comments: Patient has longstanding atrial fibrillation. Past Medical History:   Diagnosis Date    Atrial fibrillation ([address]     HTN (hypertension)        Past Surgical History:   Procedure Laterality Date    HX BREAST BIOPSY Left          History reviewed. No pertinent family history.     Social History     Socioeconomic History    Marital status:      Spouse name: Not on file    Number of children: Not on file    Years of education: Not on file    Highest education level: Not on file   Occupational History    Not on file   Tobacco Use    Smoking status: Never Smoker  Smokeless tobacco: Never Used   Substance and Sexual Activity    Alcohol use: Never    Drug use: Never    Sexual activity: Not on file   Other Topics Concern    Not on file   Social History Narrative    Not on file     Social Determinants of Health     Financial Resource Strain:     Difficulty of Paying Living Expenses:    Food Insecurity:     Worried About Running Out of Food in the Last Year:     [address] in the Last Year:    Transportation Needs:     Lack of Transportation (Medical):  Lack of Transportation (Non-Medical):    Physical Activity:     Days of Exercise per Week:     Minutes of Exercise per Session:    Stress:     Feeling of Stress :    Social Connections:     Frequency of Communication with Friends and Family:     Frequency of Social Gatherings with Friends and Family:     Attends Spiritism Services:     Active Member of Clubs or Organizations:     Attends Club or Organization Meetings:     Marital Status:    Intimate Partner Violence:     Fear of Current or Ex-Partner:     Emotionally Abused:     Physically Abused:     Sexually Abused: ALLERGIES: Patient has no known allergies. Review of Systems   Constitutional: Positive for fatigue. Negative for chills and fever. HENT: Negative for congestion, ear pain and rhinorrhea. Eyes: Negative for photophobia and discharge. Respiratory: Negative for cough and shortness of breath. Cardiovascular: Negative for chest pain and palpitations. Gastrointestinal: Negative for abdominal pain, constipation, diarrhea and vomiting. Endocrine: Negative for cold intolerance and heat intolerance. Genitourinary: Negative for dysuria and flank pain. Musculoskeletal: Negative for arthralgias, myalgias and neck pain. Skin: Negative for rash and wound. Allergic/Immunologic: Negative for environmental allergies and food allergies. Neurological: Positive for weakness.  Negative for seizures, syncope and headaches. Hematological: Negative for adenopathy. Does not bruise/bleed easily. Psychiatric/Behavioral: Negative for confusion, dysphoric mood and hallucinations. The patient is not nervous/anxious. All other systems reviewed and are negative. Vitals:    06/04/21 1554   BP: 126/87   Pulse: 84   Resp: 16   Temp: 97.5 °F (36.4 °C)   SpO2: 99%   Weight: 69 kg (152 lb 1.9 oz)   Height: 5' 5\" (1.651 m)            Physical Exam  Vitals and nursing note reviewed. Constitutional:       General: She is in acute distress. Appearance: Normal appearance. She is well-developed and normal weight. HENT:      Head: Normocephalic and atraumatic. Right Ear: External ear normal.      Left Ear: External ear normal.      Mouth/Throat:      Mouth: Mucous membranes are moist.      Pharynx: Oropharynx is clear. No oropharyngeal exudate or posterior oropharyngeal erythema. Eyes:      Extraocular Movements: Extraocular movements intact. Conjunctiva/sclera: Conjunctivae normal.      Pupils: Pupils are equal, round, and reactive to light. Neck:      Vascular: No JVD. Cardiovascular:      Rate and Rhythm: Normal rate and regular rhythm. Pulses: Normal pulses. Heart sounds: Normal heart sounds. No murmur heard. No friction rub. No gallop. Pulmonary:      Effort: Pulmonary effort is normal.      Breath sounds: Normal breath sounds. Abdominal:      General: Bowel sounds are normal. There is no distension. Palpations: Abdomen is soft. There is no mass. Tenderness: There is no abdominal tenderness. Musculoskeletal:         General: No deformity. Normal range of motion. Cervical back: Normal range of motion and neck supple. Skin:     General: Skin is warm and dry. Capillary Refill: Capillary refill takes less than 2 seconds. Findings: No rash. Neurological:      General: No focal deficit present.       Mental Status: She is alert and oriented to person, place, and time.      Cranial Nerves: No cranial nerve deficit. Sensory: No sensory deficit. Gait: Gait normal.   Psychiatric:         Mood and Affect: Mood normal.         Speech: Speech normal.         Behavior: Behavior normal.         Thought Content: Thought content normal.         Judgment: Judgment normal.          MDM  Number of Diagnoses or Management Options  Paroxysmal atrial fibrillation ([address]): established and worsening  Diagnosis management comments: EKG reviewed  Sinus rhythm with occasional PAC  Acute ischemic changes    Chest x-ray reviewed  No acute cardiopulmonary disease      Patient symptoms of generalized fatigue and weakness likely a result of an episode of rapid A. fib which triggered hypotension. This is reflected in EKG was performed at the urgent care prior to coming here to the ER. We will check labs including electrolytes and renal function. Patient will be kept on the cardiac monitor with frequent blood pressure checks    8:50 PM  Second troponin remains normal    Patient remains asymptomatic    Discharge with close cardiology follow-up       Amount and/or Complexity of Data Reviewed  Clinical lab tests: ordered and reviewed  Tests in the radiology section of CPT®: ordered and reviewed  Decide to obtain previous medical records or to obtain history from someone other than the patient: yes  Review and summarize past medical records: yes  Independent visualization of images, tracings, or specimens: yes    Risk of Complications, Morbidity, and/or Mortality  Presenting problems: high  Diagnostic procedures: moderate  Management options: moderate  General comments: Elements of this note have been dictated via voice recognition software. Text and phrases may be limited by the accuracy of the software. The chart has been reviewed, but errors may still be present.       Patient Progress  Patient progress: stable         EKG    Date/Time: 6/4/2021 4:43 PM  Performed by: Abraham Rodriguez MD  Authorized by: Gaby Teran MD     ECG reviewed by ED Physician in the absence of a cardiologist: yes    Previous ECG:     Previous ECG:  Compared to current    Comparison ECG info:  Heart rate increased and patient is no longer bradycardic.   PACs now present    Similarity:  Changes noted  Interpretation:     Interpretation: abnormal    Rate:     ECG rate assessment: normal    Rhythm:     Rhythm: sinus rhythm    Ectopy:     Ectopy: PAC    QRS:     QRS axis:  Normal    QRS intervals:  Normal  T waves:     T waves: flattening      Flattening:  II and III  Comments:      No acute ischemic changes

## 2021-06-05 NOTE — ED NOTES
I have reviewed discharge instructions with the patient. The patient verbalized understanding. Patient left ED via Discharge Method: ambulatory to Home with family/spouse. Opportunity for questions and clarification provided. Patient given 0 scripts. To continue your aftercare when you leave the hospital, you may receive an automated call from our care team to check in on how you are doing. This is a free service and part of our promise to provide the best care and service to meet your aftercare needs.  If you have questions, or wish to unsubscribe from this service please call [phone]. Thank you for Choosing our [facility] Emergency Department.

## 2021-06-05 NOTE — DISCHARGE INSTRUCTIONS
Continue all your current medications  Drink plenty of fluids  Follow-up with your cardiologist on Monday  Return to ER for any worsening symptoms or new problems which may arise

## 2021-06-07 LAB
BACTERIA SPEC CULT: NORMAL
BACTERIA SPEC CULT: NORMAL
SERVICE CMNT-IMP: NORMAL

## 2021-06-16 ENCOUNTER — TRANSCRIBE ORDER (OUTPATIENT)
Dept: SCHEDULING | Age: 76
End: 2021-06-16

## 2021-06-16 DIAGNOSIS — R92.8 ABNORMAL MAMMOGRAM OF RIGHT BREAST: Primary | ICD-10-CM

## 2021-06-23 ENCOUNTER — HOSPITAL ENCOUNTER (OUTPATIENT)
Dept: MAMMOGRAPHY | Age: 76
End: 2021-06-23
Attending: INTERNAL MEDICINE

## 2021-07-28 ENCOUNTER — HOSPITAL ENCOUNTER (OUTPATIENT)
Dept: MAMMOGRAPHY | Age: 76
Discharge: HOME | End: 2021-07-28
Attending: INTERNAL MEDICINE
Payer: MEDICARE

## 2021-07-28 DIAGNOSIS — R92.8 ABNORMAL MAMMOGRAM OF RIGHT BREAST: ICD-10-CM

## 2021-07-28 PROCEDURE — 76642 ULTRASOUND BREAST LIMITED: CPT

## 2021-12-01 ENCOUNTER — HOSPITAL ENCOUNTER (OUTPATIENT)
Dept: LAB | Age: 76
Discharge: HOME | End: 2021-12-01
Payer: MEDICARE

## 2021-12-01 DIAGNOSIS — E78.5 DYSLIPIDEMIA: ICD-10-CM

## 2021-12-01 DIAGNOSIS — I48.0 PAF (PAROXYSMAL ATRIAL FIBRILLATION) (HCC): ICD-10-CM

## 2021-12-01 DIAGNOSIS — I10 ESSENTIAL HYPERTENSION: ICD-10-CM

## 2021-12-01 LAB
ALBUMIN SERPL-MCNC: 3.5 G/DL (ref 3.2–4.6)
ALBUMIN/GLOB SERPL: 1 {RATIO} (ref 1.2–3.5)
ALP SERPL-CCNC: 95 U/L (ref 50–136)
ALT SERPL-CCNC: 35 U/L (ref 12–65)
ANION GAP SERPL CALC-SCNC: 6 MMOL/L (ref 7–16)
AST SERPL-CCNC: 22 U/L (ref 15–37)
BASOPHILS # BLD: 0.1 K/UL (ref 0–0.2)
BASOPHILS NFR BLD: 1 % (ref 0–2)
BILIRUB SERPL-MCNC: 0.4 MG/DL (ref 0.2–1.1)
BUN SERPL-MCNC: 23 MG/DL (ref 8–23)
CALCIUM SERPL-MCNC: 9.6 MG/DL (ref 8.3–10.4)
CHLORIDE SERPL-SCNC: 110 MMOL/L (ref 98–107)
CO2 SERPL-SCNC: 28 MMOL/L (ref 21–32)
CREAT SERPL-MCNC: 0.94 MG/DL (ref 0.6–1)
DIFFERENTIAL METHOD BLD: NORMAL
EOSINOPHIL # BLD: 0.1 K/UL (ref 0–0.8)
EOSINOPHIL NFR BLD: 2 % (ref 0.5–7.8)
ERYTHROCYTE [DISTWIDTH] IN BLOOD BY AUTOMATED COUNT: 13 % (ref 11.9–14.6)
GLOBULIN SER CALC-MCNC: 3.5 G/DL (ref 2.3–3.5)
GLUCOSE SERPL-MCNC: 96 MG/DL (ref 65–100)
HCT VFR BLD AUTO: 41.9 % (ref 35.8–46.3)
HGB BLD-MCNC: 14 G/DL (ref 11.7–15.4)
IMM GRANULOCYTES # BLD AUTO: 0 K/UL (ref 0–0.5)
IMM GRANULOCYTES NFR BLD AUTO: 0 % (ref 0–5)
LYMPHOCYTES # BLD: 1.5 K/UL (ref 0.5–4.6)
LYMPHOCYTES NFR BLD: 26 % (ref 13–44)
MCH RBC QN AUTO: 30.3 PG (ref 26.1–32.9)
MCHC RBC AUTO-ENTMCNC: 33.4 G/DL (ref 31.4–35)
MCV RBC AUTO: 90.7 FL (ref 79.6–97.8)
MONOCYTES # BLD: 0.6 K/UL (ref 0.1–1.3)
MONOCYTES NFR BLD: 10 % (ref 4–12)
NEUTS SEG # BLD: 3.6 K/UL (ref 1.7–8.2)
NEUTS SEG NFR BLD: 61 % (ref 43–78)
NRBC # BLD: 0 K/UL (ref 0–0.2)
PLATELET # BLD AUTO: 308 K/UL (ref 150–450)
PMV BLD AUTO: 10.8 FL (ref 9.4–12.3)
POTASSIUM SERPL-SCNC: 3.5 MMOL/L (ref 3.5–5.1)
PROT SERPL-MCNC: 7 G/DL (ref 6.3–8.2)
RBC # BLD AUTO: 4.62 M/UL (ref 4.05–5.2)
SODIUM SERPL-SCNC: 144 MMOL/L (ref 136–145)
T4 FREE SERPL-MCNC: 1 NG/DL (ref 0.78–1.46)
TSH SERPL DL<=0.005 MIU/L-ACNC: 1.9 UIU/ML (ref 0.36–3.74)
WBC # BLD AUTO: 5.9 K/UL (ref 4.3–11.1)

## 2021-12-01 PROCEDURE — 85025 COMPLETE CBC W/AUTO DIFF WBC: CPT

## 2021-12-01 PROCEDURE — 84439 ASSAY OF FREE THYROXINE: CPT

## 2021-12-01 PROCEDURE — 36415 COLL VENOUS BLD VENIPUNCTURE: CPT

## 2021-12-01 PROCEDURE — 84443 ASSAY THYROID STIM HORMONE: CPT

## 2021-12-01 PROCEDURE — 80053 COMPREHEN METABOLIC PANEL: CPT

## 2021-12-02 NOTE — PROGRESS NOTES
Called to pre-assess for Ezequiel Ramirez, cardioversion with Dr. Azucena Bustamante. , Scheduled to arrive at 1230PM. No answer & message left. Voicemail included following information:    NPO status reinforced. Pt reminded to take Eliquis tonight and morning of procedure. Location of surgery reinforced. Patient encouraged to call back with any questions, comments, concerns.

## 2021-12-03 ENCOUNTER — HOSPITAL ENCOUNTER (OUTPATIENT)
Dept: CARDIAC CATH/INVASIVE PROCEDURES | Age: 76
Discharge: HOME | End: 2021-12-03
Attending: INTERNAL MEDICINE | Admitting: INTERNAL MEDICINE
Payer: MEDICARE

## 2021-12-03 ENCOUNTER — APPOINTMENT (OUTPATIENT)
Dept: NON INVASIVE DIAGNOSTICS | Age: 76
End: 2021-12-03
Attending: INTERNAL MEDICINE
Payer: MEDICARE

## 2021-12-03 VITALS
SYSTOLIC BLOOD PRESSURE: 118 MMHG | HEIGHT: 65 IN | BODY MASS INDEX: 26.49 KG/M2 | WEIGHT: 159 LBS | DIASTOLIC BLOOD PRESSURE: 78 MMHG

## 2021-12-03 DIAGNOSIS — I48.0 PAF (PAROXYSMAL ATRIAL FIBRILLATION) (HCC): ICD-10-CM

## 2021-12-03 DIAGNOSIS — E78.5 DYSLIPIDEMIA: ICD-10-CM

## 2021-12-03 DIAGNOSIS — I10 ESSENTIAL HYPERTENSION: ICD-10-CM

## 2021-12-03 LAB
ATRIAL RATE: 76 BPM
CALCULATED P AXIS, ECG09: 87 DEGREES
CALCULATED R AXIS, ECG10: 8 DEGREES
CALCULATED T AXIS, ECG11: 61 DEGREES
DIAGNOSIS, 93000: NORMAL
ECHO AO ASC DIAM: 3.28 CM
ECHO AO ROOT DIAM: 3.36 CM
ECHO AV AREA PEAK VELOCITY: 1.93 CM2
ECHO AV AREA PEAK VELOCITY: 1.93 CM2
ECHO AV PEAK GRADIENT: 6 MMHG
ECHO AV PEAK VELOCITY: 122.51 CM/S
ECHO EST RA PRESSURE: 8 MMHG
ECHO LA MAJOR AXIS: 3.5 CM
ECHO LA MINOR AXIS: 1.96 CM
ECHO LV E' LATERAL VELOCITY: 8.36 CM/S
ECHO LV E' SEPTAL VELOCITY: 7.18 CM/S
ECHO LV EDV A2C: 47.14 ML
ECHO LV EDV A4C: 75 ML
ECHO LV EDV BP: 60.48 ML (ref 56–104)
ECHO LV EDV INDEX A4C: 41.9 ML/M2
ECHO LV EDV INDEX BP: 33.8 ML/M2
ECHO LV EDV NDEX A2C: 26.3 ML/M2
ECHO LV EJECTION FRACTION A2C: 56 PERCENT
ECHO LV EJECTION FRACTION A4C: 72 PERCENT
ECHO LV EJECTION FRACTION BIPLANE: 65.7 PERCENT (ref 55–100)
ECHO LV ESV A2C: 20.96 ML
ECHO LV ESV A4C: 20.76 ML
ECHO LV ESV BP: 20.75 ML (ref 19–49)
ECHO LV ESV INDEX A2C: 11.7 ML/M2
ECHO LV ESV INDEX A4C: 11.6 ML/M2
ECHO LV ESV INDEX BP: 11.6 ML/M2
ECHO LV INTERNAL DIMENSION DIASTOLIC: 4.26 CM (ref 3.9–5.3)
ECHO LV INTERNAL DIMENSION SYSTOLIC: 2.96 CM
ECHO LV IVSD: 1.14 CM (ref 0.6–0.9)
ECHO LV MASS 2D: 161.6 G (ref 67–162)
ECHO LV MASS INDEX 2D: 90.3 G/M2 (ref 43–95)
ECHO LV POSTERIOR WALL DIASTOLIC: 1.07 CM (ref 0.6–0.9)
ECHO LVOT DIAM: 1.87 CM
ECHO LVOT PEAK GRADIENT: 2.97 MMHG
ECHO LVOT PEAK VELOCITY: 86.23 CM/S
ECHO MV A VELOCITY: 40.41 CM/S
ECHO MV AREA PHT: 4.44 CM2
ECHO MV E DECELERATION TIME (DT): 170.91 MS
ECHO MV E VELOCITY: 75.33 CM/S
ECHO MV E/A RATIO: 1.86
ECHO MV E/E' LATERAL: 9.01
ECHO MV E/E' RATIO (AVERAGED): 9.75
ECHO MV E/E' SEPTAL: 10.49
ECHO MV PRESSURE HALF TIME (PHT): 49.56 MS
ECHO RIGHT VENTRICULAR SYSTOLIC PRESSURE (RVSP): 32 MMHG
ECHO RV INTERNAL DIMENSION: 3.06 CM
ECHO RV TAPSE: 2.05 CM (ref 1.5–2)
ECHO TV REGURGITANT MAX VELOCITY: 244.18 CM/S
ECHO TV REGURGITANT PEAK GRADIENT: 23.85 MMHG
P-R INTERVAL, ECG05: 144 MS
Q-T INTERVAL, ECG07: 430 MS
QRS DURATION, ECG06: 76 MS
QTC CALCULATION (BEZET), ECG08: 483 MS
VENTRICULAR RATE, ECG03: 76 BPM

## 2021-12-03 PROCEDURE — 93005 ELECTROCARDIOGRAM TRACING: CPT

## 2021-12-03 PROCEDURE — 93306 TTE W/DOPPLER COMPLETE: CPT

## 2021-12-03 RX ORDER — SODIUM CHLORIDE 9 MG/ML
75 INJECTION, SOLUTION INTRAVENOUS CONTINUOUS
Status: DISCONTINUED | OUTPATIENT
Start: 2021-12-03 | End: 2021-12-03 | Stop reason: HOSPADM

## 2021-12-03 NOTE — PROGRESS NOTES
Patient received to [address] [place] [address] room # 14  Ambulatory from [facility]. Patient scheduled for Echo and CVN today with Dr Krish Herrera. Procedure reviewed & questions answered, voiced good understanding consent obtained & placed on chart. All medications and medical history reviewed. Will prep patient per orders. Patient & family updated on plan of care.       The patient has a fraility score of 3-MANAGING WELL, based on age and abilities

## 2022-03-18 PROBLEM — I51.7 LEFT ATRIAL ENLARGEMENT: Status: ACTIVE | Noted: 2020-03-13

## 2022-03-20 PROBLEM — I10 HTN (HYPERTENSION): Status: ACTIVE | Noted: 2020-03-11

## 2022-03-31 ENCOUNTER — TRANSCRIBE ORDER (OUTPATIENT)
Dept: SCHEDULING | Age: 77
End: 2022-03-31

## 2022-03-31 DIAGNOSIS — M85.80 OSTEOPENIA: Primary | ICD-10-CM

## 2022-03-31 DIAGNOSIS — Z12.31 ENCOUNTER FOR SCREENING MAMMOGRAM FOR MALIGNANT NEOPLASM OF BREAST: Primary | ICD-10-CM

## 2022-10-28 NOTE — TELEPHONE ENCOUNTER
Patient called stating she would appreciate samples of :    (ELIQUIS) 5 MG TABS tablet. Patient states she is out of her Rx and waiting for it to be shipped. Patient would like to pick these up at the [place] [address] office. Please call if need be.

## 2022-10-28 NOTE — TELEPHONE ENCOUNTER
Patient has called back again ablut Eliquis samples. She needs to pick them up today because she is out. Please call patient when samples are ready.

## 2022-10-28 NOTE — TELEPHONE ENCOUNTER
Called pt back. No answer. Lvm that samples are ready, I will also submit a prescription to her local pharmacy so she can  a few to get her through the weekend if she does not make it up here before we close. Rx pended and forwarded to Dr. López Ledbetter for signature.

## 2023-01-18 RX ORDER — AMIODARONE HYDROCHLORIDE 200 MG/1
TABLET ORAL
Qty: 30 TABLET | Refills: 0 | Status: SHIPPED | OUTPATIENT
Start: 2023-01-18

## 2023-01-18 NOTE — TELEPHONE ENCOUNTER
Requested Prescriptions     Pending Prescriptions Disp Refills    amiodarone (CORDARONE) 200 MG tablet [Pharmacy Med Name: AMIODARONE HYDROCHLORIDE 200 MG Tablet] 30 tablet 0     Sig: TAKE 1/2 TABLET TWICE DAILY        Yearly appt needed for refill. Scheduled appt for 2/14/23.

## 2023-02-14 ENCOUNTER — OFFICE VISIT (OUTPATIENT)
Dept: CARDIOLOGY CLINIC | Age: 78
End: 2023-02-14

## 2023-02-14 VITALS
HEART RATE: 59 BPM | HEIGHT: 65 IN | SYSTOLIC BLOOD PRESSURE: 132 MMHG | DIASTOLIC BLOOD PRESSURE: 80 MMHG | BODY MASS INDEX: 26.82 KG/M2 | WEIGHT: 161 LBS

## 2023-02-14 DIAGNOSIS — I48.0 PAROXYSMAL ATRIAL FIBRILLATION (HCC): ICD-10-CM

## 2023-02-14 DIAGNOSIS — I10 PRIMARY HYPERTENSION: Primary | ICD-10-CM

## 2023-02-14 DIAGNOSIS — E78.2 MIXED HYPERLIPIDEMIA: ICD-10-CM

## 2023-02-14 RX ORDER — AMIODARONE HYDROCHLORIDE 200 MG/1
100 TABLET ORAL DAILY
Qty: 45 TABLET | Refills: 3 | Status: SHIPPED | OUTPATIENT
Start: 2023-02-14

## 2023-02-14 NOTE — PROGRESS NOTES
[address], [address]  PHONE: [phone]    SUBJECTIVE: Ioana Link (1945) is a 64 y.o. female seen for a follow up visit regarding the following:        ICD-10-CM ICD-9-CM   1. PAF (paroxysmal atrial fibrillation) (HCC)  I48.0 427.31   2. Essential hypertension  I10 401.9   3. Dyslipidemia  E78.5 272.4   4. History of cardioversion  Z98.890 V15.1     Initial presentation was he had initial presentation was in March 2020 for new onset atrial fibrillation she underwent an electrical cardioversion and has done well since then. Her last visit was October 21 of 2020 at which point she was doing well it appears as though she may have had an episode of recurrent paroxysmal A. fib in June of this year. Went to an urgent care with paroxysmal A. fib was transferred to the emergency room and sounds like she cardioverted prior to arriving at the emergency room cursory work-up there was negative. She is now in for routine follow-up today  12/14/21  Patient is currently in A. fib with RVR she has had a history of atrial fibrillation and in fact underwent a LOULOU cardioversion in March 2020 she presents with an EKG from yesterday showing A. fib with a ventricular response of 141 bpm.  She is on chronic anticoagulation. She is not on any rate control medicines we will initiate either rate control or an antiarrhythmic medication today and plan for a cardioversion Thursday or Friday 12/14/21  Patient was originally set up for a cardioversion over at the hospital when she showed up for the cardioversion she was in normal sinus rhythm with frequent PACs therefore the cardioversion was canceled she did undergo a 2D echocardiogram  Her echocardiogram showed normal LV function mild TR mild MR right ventricular systolic pressure of 32 and a mildly dilated left atrium    Patient's EKG today is normal sinus rhythm    2/14/23  Pt doing well. No chest pain.  No palpitations. Patient denies syncope. No dyspnea. States they are taking meds. Maintains a normal level of activity for them without symptoms. No dizziness or lightheadedness. All above conditions stable. Will decrease amiodarone to 100 mg qd      Past Medical History, Past Surgical History, Family history, Social History, and Medications were all reviewed with the patient today and updated as necessary. No Known Allergies  Past Medical History:   Diagnosis Date    Atrial fibrillation (HCC)     HTN (hypertension)      Past Surgical History:   Procedure Laterality Date    HX BREAST BIOPSY Left      No family history on file. Social History     Tobacco Use    Smoking status: Never Smoker    Smokeless tobacco: Never Used   Substance Use Topics    Alcohol use: Never     Pt does not have history of early onset cad or heart failure in immediate family members    Current Outpatient Medications:     cholecalciferol, vitamin D3, (Vitamin D3) 50 mcg (2,000 unit) tab, Take  by mouth., Disp: , Rfl:     diphenhydrAMINE-acetaminophen (Tylenol PM Extra Strength)  mg tab, Take  by mouth., Disp: , Rfl:     pravastatin (PRAVACHOL) 20 mg tablet, Take 20 mg by mouth nightly., Disp: , Rfl:     valsartan-hydroCHLOROthiazide (DIOVAN-HCT) 80-12.5 mg per tablet, Take 1 Tab by mouth daily. , Disp: 90 Tab, Rfl: 3    apixaban (ELIQUIS) 5 mg tablet, Take 1 Tab by mouth two (2) times a day., Disp: 60 Tab, Rfl: 0    amiodarone (Pacerone) 100 mg tablet, Take 1 Tablet by mouth two (2) times a day., Disp: 90 Tablet, Rfl: 3        ROS:  Review of Systems - General ROS: negative for - chills, fatigue or fever  Hematological and Lymphatic ROS: negative for - bleeding problems, bruising or jaundice  Respiratory ROS: no cough, shortness of breath, or wheezing  Cardiovascular ROS: no chest pain or dyspnea on exertion  Gastrointestinal ROS: no abdominal pain, change in bowel habits, or black or bloody stools  Neurological ROS: no TIA or stroke symptoms  All other systems negative.       Wt Readings from Last 3 Encounters:   12/14/21 161 lb (73 kg)   12/03/21 159 lb (72.1 kg)   12/01/21 159 lb (72.1 kg)     Temp Readings from Last 3 Encounters:   06/04/21 97.5 °F (36.4 °C)   03/14/20 97.8 °F (36.6 °C)   03/13/20 98.1 °F (36.7 °C)     BP Readings from Last 3 Encounters:   12/14/21 (!) 152/84   12/03/21 118/78   12/01/21 118/78     Pulse Readings from Last 3 Encounters:   12/14/21 (!) 56   12/01/21 (!) 148   08/02/21 64           PHYSICAL EXAM:  Visit Vitals  BP (!) 152/84   Pulse (!) 56   Ht 5' 5\" (1.651 m)   Wt 161 lb (73 kg)   BMI 26.79 kg/m²       Physical Examination: General appearance - alert, well appearing, and in no distress  Mental status - alert, oriented to person, place, and time  Eyes - pupils equal and reactive, extraocular eye movements intact  Neck/lymph - supple, no significant adenopathy  Chest/lungs - clear to auscultation, no wheezes, rales or rhonchi, symmetric air entry  Heart/CV - normal rate, regular rhythm, normal S1, S2, no murmurs, rubs, clicks or gallops  Abdomen/GI - soft, nontender, nondistended, no masses or organomegaly  Musculoskeletal - no joint tenderness, deformity or swelling  Extremities - peripheral pulses normal, no pedal edema, no clubbing or cyanosis  Skin - normal coloration and turgor, no rashes, no suspicious skin lesions noted    EKG: A. fib with RVR                  Medications reviewed and questions answered    Recent Results (from the past 672 hour(s))   CBC WITH AUTOMATED DIFF    Collection Time: 12/01/21 12:54 PM   Result Value Ref Range    WBC 5.9 4.3 - 11.1 K/uL    RBC 4.62 4.05 - 5.2 M/uL    HGB 14.0 11.7 - 15.4 g/dL    HCT 41.9 35.8 - 46.3 %    MCV 90.7 79.6 - 97.8 FL    MCH 30.3 26.1 - 32.9 PG    MCHC 33.4 31.4 - 35.0 g/dL    RDW 13.0 11.9 - 14.6 %    PLATELET 615 423 - 003 K/uL    MPV 10.8 9.4 - 12.3 FL    ABSOLUTE NRBC 0.00 0.0 - 0.2 K/uL    DF AUTOMATED      NEUTROPHILS 61 43 - 78 % LYMPHOCYTES 26 13 - 44 %    MONOCYTES 10 4.0 - 12.0 %    EOSINOPHILS 2 0.5 - 7.8 %    BASOPHILS 1 0.0 - 2.0 %    IMMATURE GRANULOCYTES 0 0.0 - 5.0 %    ABS. NEUTROPHILS 3.6 1.7 - 8.2 K/UL    ABS. LYMPHOCYTES 1.5 0.5 - 4.6 K/UL    ABS. MONOCYTES 0.6 0.1 - 1.3 K/UL    ABS. EOSINOPHILS 0.1 0.0 - 0.8 K/UL    ABS. BASOPHILS 0.1 0.0 - 0.2 K/UL    ABS. IMM. GRANS. 0.0 0.0 - 0.5 K/UL   METABOLIC PANEL, COMPREHENSIVE    Collection Time: 12/01/21 12:54 PM   Result Value Ref Range    Sodium 144 136 - 145 mmol/L    Potassium 3.5 3.5 - 5.1 mmol/L    Chloride 110 (H) 98 - 107 mmol/L    CO2 28 21 - 32 mmol/L    Anion gap 6 (L) 7 - 16 mmol/L    Glucose 96 65 - 100 mg/dL    BUN 23 8 - 23 MG/DL    Creatinine 0.94 0.6 - 1.0 MG/DL    GFR est AA >60 >60 ml/min/1.73m2    GFR est non-AA >60 >60 ml/min/1.73m2    Calcium 9.6 8.3 - 10.4 MG/DL    Bilirubin, total 0.4 0.2 - 1.1 MG/DL    ALT (SGPT) 35 12 - 65 U/L    AST (SGOT) 22 15 - 37 U/L    Alk.  phosphatase 95 50 - 136 U/L    Protein, total 7.0 6.3 - 8.2 g/dL    Albumin 3.5 3.2 - 4.6 g/dL    Globulin 3.5 2.3 - 3.5 g/dL    A-G Ratio 1.0 (L) 1.2 - 3.5     TSH 3RD GENERATION    Collection Time: 12/01/21 12:54 PM   Result Value Ref Range    TSH 1.900 0.358 - 3.740 uIU/mL   T4, FREE    Collection Time: 12/01/21 12:54 PM   Result Value Ref Range    T4, Free 1.0 0.78 - 1.46 NG/DL   EKG, 12 LEAD, INITIAL    Collection Time: 12/03/21  1:07 PM   Result Value Ref Range    Ventricular Rate 76 BPM    Atrial Rate 76 BPM    P-R Interval 144 ms    QRS Duration 76 ms    Q-T Interval 430 ms    QTC Calculation (Bezet) 483 ms    Calculated P Axis 87 degrees    Calculated R Axis 8 degrees    Calculated T Axis 61 degrees    Diagnosis       Sinus rhythm with Premature supraventricular complexes  Nonspecific T wave abnormality  Prolonged QT  Abnormal ECG  When compared with ECG of 04-JUN-2021 16:30,  No significant change was found  Confirmed by Terence Merida MD (), JONN MCCLENDON ([id number]) on 12/3/2021 2:02:46 PM ECHO ADULT COMPLETE    Collection Time: 12/03/21  1:45 PM   Result Value Ref Range    IVSd 1.14 (A) 0.6 - 0.9 cm    LVIDd 4.26 3.9 - 5.3 cm    LVIDs 2.96 cm    LVOT Diameter 1.87 cm    LVPWd 1.07 (A) 0.6 - 0.9 cm    EF BP 65.7 55 - 100 percent    LV Ejection Fraction A2C 56 percent    LV Ejection Fraction A4C 72 percent    LV EDV A2C 47.14 mL    LV EDV A4C 75.00 mL    LV EDV BP 60.48 56 - 104 mL    LV ESV A2C 20.96 mL    LV ESV A4C 20.76 mL    LV ESV BP 20.75 19 - 49 mL    LVOT Peak Gradient 2.97 mmHg    LVOT Peak Velocity 86.23 cm/s    RVIDd 3.06 cm    LA [place] 3.50 cm    AV Area by Peak Velocity 1.93 cm2    AV Area by Peak Velocity 1.93 cm2    AV Peak Gradient 6.00 mmHg    AV Peak Velocity 122.51 cm/s    MV A Velocity 40.41 cm/s    MV E Wave Deceleration Time 170.91 ms    MV E Velocity 75.33 cm/s    E/E' Ratio (Averaged) 9.75     E/E' Lateral 9.01     E/E' Septal 10.49     LV E' Lateral Velocity 8.36 cm/s    LV E' Septal Velocity 7.18 cm/s    MV PHT 49.56 ms    MV Area by PHT 4.44 cm2    TAPSE 2.05 (A) 1.5 - 2.0 cm    TR Peak Gradient 23.85 mmHg    TR Max Velocity 244.18 cm/s    Ascending Aorta 3.28 cm    Aortic Root 3.36 cm    MV E/A 1.86     LV Mass 2D 161.6 67 - 162 g    LV Mass 2D Index 90.3 43 - 95 g/m2    LV ESV Index BP 11.6 mL/m2    LV EDV Index BP 33.8 mL/m2    LA [place] 1.96 cm    LV EDV Index A4C 41.9 mL/m2    LV EDV Index A2C 26.3 mL/m2    LV ESV Index A4C 11.6 mL/m2    LV ESV Index A2C 11.7 mL/m2    RVSP 32.0 mmHg    Est. RA Pressure 8.0 mmHg     No results found for: CHOL, CHOLPOCT, CHOLX, CHLST, CHOLV, HDL, HDLPOC, HDLP, LDL, LDLCPOC, LDLC, DLDLP, VLDLC, VLDL, TGLX, TRIGL, TRIGP, TGLPOCT, CHHD, CHHDX      ASSESSMENT and PLAN        1. History of cardioversion  The current medical regimen is effective;  continue present plan and medications. 2. PAF (paroxysmal atrial fibrillation) (HCC)  The current medical regimen is effective;  continue present plan and medications.       3. Essential hypertension  The current medical regimen is effective;  continue present plan and medications. 4. Dyslipidemia  The current medical regimen is effective;  continue present plan and medications. Decrease Pacerone to 100 mg p.o. daily return in 1 year  labs ordered  Orders Placed This Encounter    AMB POC EKG ROUTINE W/ 12 LEADS, INTER & REP     Order Specific Question:   Reason for Exam:     Answer:   See diagnosis    amiodarone (Pacerone) 100 mg tablet     Sig: Take 1 Tablet by mouth two (2) times a day. Dispense:  90 Tablet     Refill:  3       Pt is instructed to follow all appropriate cardiac risk factor recommendations and to be compliant with meds and testing. Instructed to follow up appropriately and seek urgent medical care if acute or unstable issues arise. Results of some tests may be viewed thru [address] but this does not substitute for follow up with MD. If follow up is not scheduled pt is instructed to call for follow up      Follow-up and Dispositions    Return in about 3 months (around 3/14/2022).                Ed MD Pushpa  12/14/2021  4:29 PM

## 2023-05-03 RX ORDER — AMIODARONE HYDROCHLORIDE 200 MG/1
100 TABLET ORAL DAILY
Qty: 45 TABLET | Refills: 3 | Status: SHIPPED | OUTPATIENT
Start: 2023-05-03

## 2023-05-03 NOTE — TELEPHONE ENCOUNTER
Requested Prescriptions     Pending Prescriptions Disp Refills    amiodarone (CORDARONE) 200 MG tablet 45 tablet 3     Sig: Take 0.5 tablets by mouth daily

## 2023-05-03 NOTE — TELEPHONE ENCOUNTER
MEDICATION REFILL REQUEST      Name of Medication:  Amiodarone  Dose:  100 mg  Frequency:  take 0.5 mg by mouth daily  Quantity:  ?  Days' supply:  90      Pharmacy Name/Location:  [facility] 90 day supply

## 2023-05-31 ENCOUNTER — OFFICE VISIT (OUTPATIENT)
Age: 78
End: 2023-05-31
Payer: MEDICARE

## 2023-05-31 VITALS
DIASTOLIC BLOOD PRESSURE: 82 MMHG | WEIGHT: 162 LBS | SYSTOLIC BLOOD PRESSURE: 130 MMHG | BODY MASS INDEX: 26.96 KG/M2 | HEART RATE: 60 BPM

## 2023-05-31 DIAGNOSIS — E78.2 MIXED HYPERLIPIDEMIA: ICD-10-CM

## 2023-05-31 DIAGNOSIS — I10 PRIMARY HYPERTENSION: Primary | ICD-10-CM

## 2023-05-31 DIAGNOSIS — I48.0 PAROXYSMAL ATRIAL FIBRILLATION (HCC): ICD-10-CM

## 2023-05-31 PROCEDURE — 1123F ACP DISCUSS/DSCN MKR DOCD: CPT | Performed by: INTERNAL MEDICINE

## 2023-05-31 PROCEDURE — 1036F TOBACCO NON-USER: CPT | Performed by: INTERNAL MEDICINE

## 2023-05-31 PROCEDURE — 3075F SYST BP GE 130 - 139MM HG: CPT | Performed by: INTERNAL MEDICINE

## 2023-05-31 PROCEDURE — 3079F DIAST BP 80-89 MM HG: CPT | Performed by: INTERNAL MEDICINE

## 2023-05-31 PROCEDURE — G8417 CALC BMI ABV UP PARAM F/U: HCPCS | Performed by: INTERNAL MEDICINE

## 2023-05-31 PROCEDURE — 1090F PRES/ABSN URINE INCON ASSESS: CPT | Performed by: INTERNAL MEDICINE

## 2023-05-31 PROCEDURE — G8428 CUR MEDS NOT DOCUMENT: HCPCS | Performed by: INTERNAL MEDICINE

## 2023-05-31 PROCEDURE — G8399 PT W/DXA RESULTS DOCUMENT: HCPCS | Performed by: INTERNAL MEDICINE

## 2023-05-31 PROCEDURE — 99214 OFFICE O/P EST MOD 30 MIN: CPT | Performed by: INTERNAL MEDICINE

## 2023-05-31 NOTE — PROGRESS NOTES
[address] [place], [place]  [address], [address]  PHONE: [phone]    SUBJECTIVE: Vickie Higginbotham (1945) is a 64 y.o. female seen for a follow up visit regarding the following:        ICD-10-CM ICD-9-CM   1. PAF (paroxysmal atrial fibrillation) (HCC)  I48.0 427.31   2. Essential hypertension  I10 401.9   3. Dyslipidemia  E78.5 272.4   4. History of cardioversion  Z98.890 V15.1     Initial presentation was he had initial presentation was in March 2020 for new onset atrial fibrillation she underwent an electrical cardioversion and has done well since then. Her last visit was October 21 of 2020 at which point she was doing well it appears as though she may have had an episode of recurrent paroxysmal A. fib in June of this year. Went to an urgent care with paroxysmal A. fib was transferred to the emergency room and sounds like she cardioverted prior to arriving at the emergency room cursory work-up there was negative. She is now in for routine follow-up today  12/14/21  Patient is currently in A. fib with RVR she has had a history of atrial fibrillation and in fact underwent a LOULOU cardioversion in March 2020 she presents with an EKG from yesterday showing A. fib with a ventricular response of 141 bpm.  She is on chronic anticoagulation. She is not on any rate control medicines we will initiate either rate control or an antiarrhythmic medication today and plan for a cardioversion Thursday or Friday 12/14/21  Patient was originally set up for a cardioversion over at the hospital when she showed up for the cardioversion she was in normal sinus rhythm with frequent PACs therefore the cardioversion was canceled she did undergo a 2D echocardiogram  Her echocardiogram showed normal LV function mild TR mild MR right ventricular systolic pressure of 32 and a mildly dilated left atrium    Patient's EKG today is normal sinus rhythm    2/14/23  Pt doing well. No chest pain.  No

## 2023-06-28 ENCOUNTER — TELEPHONE (OUTPATIENT)
Age: 78
End: 2023-06-28

## 2023-11-16 ENCOUNTER — TELEPHONE (OUTPATIENT)
Age: 78
End: 2023-11-16

## 2023-11-16 NOTE — TELEPHONE ENCOUNTER
Called pt. Informed her of samples available in [place] office. Pt verbalized understanding and will pick them up tomorrow.

## 2024-04-24 NOTE — PROGRESS NOTES
Visit    None     Hypertension  - Blood pressure today: 150/86  - Current therapy seems to be effective - continue    Dyslipidemia  - Repeat labs - will have labs drawn Monday at PCP and will bring results next time she comes in   - Continue current regimen    Paroxysmal Atrial Fibrillation  - Patient states she is only symptomatic during periods of high stress  - Continue current stroke reduction therapy  [id number] Score for Atrial Fibrillation Stroke Risk   Risk   Factors  Component Value   C CHF No 0   H HTN Yes 1   A2 Age >= 75 Yes,  (79 y.o.) 2   D DM No 0   S2 Prior Stroke/TIA No 0   V Vascular Disease No 0   A Age 65-74 No,  (79 y.o.) 0   Sc Sex female 1    [id number]  Score  4   Score last updated 4/24/24 12:29 PM EDT    Click here for a link to the UpToDate guideline \"Atrial Fibrillation: Anticoagulation therapy to prevent embolization    Disclaimer: Risk Score calculation is dependent on accuracy of patient problem list and past encounter diagnosis.      Continue meds as below    Current Outpatient Medications:     UNABLE TO FIND, Mind works supplement, Disp: , Rfl:     amiodarone (CORDARONE) 200 MG tablet, Take 0.5 tablets by mouth daily, Disp: 45 tablet, Rfl: 3    apixaban (ELIQUIS) 5 MG TABS tablet, Take 1 tablet by mouth 2 times daily, Disp: 60 tablet, Rfl: 5    Cholecalciferol 50 MCG (2000 UT) TABS, Take by mouth, Disp: , Rfl:     diphenhydrAMINE-APAP, sleep, (TYLENOL PM EXTRA STRENGTH)  MG tablet, Take by mouth, Disp: , Rfl:     pravastatin (PRAVACHOL) 20 MG tablet, Take 1 tablet by mouth, Disp: , Rfl:     valsartan-hydroCHLOROthiazide (DIOVAN-HCT) 80-12.5 MG per tablet, Take 1 tablet by mouth daily, Disp: , Rfl:     GUY JOHNSON  4/24/2024  12:27 PM    Appropriate evaluation of guideline directed medical therapy for the patient's conditions have been reviewed.  If appropriate, adjustment of therapy for underlying cardiac issues has been offered.  If patient wishes for adjustment of

## 2024-04-25 ENCOUNTER — OFFICE VISIT (OUTPATIENT)
Age: 79
End: 2024-04-25
Payer: MEDICARE

## 2024-04-25 VITALS
HEART RATE: 59 BPM | HEIGHT: 65 IN | SYSTOLIC BLOOD PRESSURE: 150 MMHG | BODY MASS INDEX: 27.19 KG/M2 | WEIGHT: 163.2 LBS | DIASTOLIC BLOOD PRESSURE: 86 MMHG

## 2024-04-25 DIAGNOSIS — E78.2 MIXED HYPERLIPIDEMIA: ICD-10-CM

## 2024-04-25 DIAGNOSIS — I10 PRIMARY HYPERTENSION: ICD-10-CM

## 2024-04-25 DIAGNOSIS — I48.0 PAROXYSMAL ATRIAL FIBRILLATION (HCC): Primary | ICD-10-CM

## 2024-04-25 PROCEDURE — 1090F PRES/ABSN URINE INCON ASSESS: CPT | Performed by: INTERNAL MEDICINE

## 2024-04-25 PROCEDURE — 1036F TOBACCO NON-USER: CPT | Performed by: INTERNAL MEDICINE

## 2024-04-25 PROCEDURE — 1123F ACP DISCUSS/DSCN MKR DOCD: CPT | Performed by: INTERNAL MEDICINE

## 2024-04-25 PROCEDURE — 3079F DIAST BP 80-89 MM HG: CPT | Performed by: INTERNAL MEDICINE

## 2024-04-25 PROCEDURE — 93000 ELECTROCARDIOGRAM COMPLETE: CPT | Performed by: INTERNAL MEDICINE

## 2024-04-25 PROCEDURE — 3077F SYST BP >= 140 MM HG: CPT | Performed by: INTERNAL MEDICINE

## 2024-04-25 PROCEDURE — G8417 CALC BMI ABV UP PARAM F/U: HCPCS | Performed by: INTERNAL MEDICINE

## 2024-04-25 PROCEDURE — G8428 CUR MEDS NOT DOCUMENT: HCPCS | Performed by: INTERNAL MEDICINE

## 2024-04-25 PROCEDURE — G8399 PT W/DXA RESULTS DOCUMENT: HCPCS | Performed by: INTERNAL MEDICINE

## 2024-04-25 PROCEDURE — 99214 OFFICE O/P EST MOD 30 MIN: CPT | Performed by: INTERNAL MEDICINE

## 2024-04-25 RX ORDER — AMIODARONE HYDROCHLORIDE 200 MG/1
100 TABLET ORAL DAILY
Qty: 45 TABLET | Refills: 3 | Status: SHIPPED | OUTPATIENT
Start: 2024-04-25

## 2024-06-11 NOTE — TELEPHONE ENCOUNTER
Requested Prescriptions     Pending Prescriptions Disp Refills    apixaban (ELIQUIS) 5 MG TABS tablet 180 tablet 0     Sig: Take 1 tablet by mouth 2 times daily             Verified rx. Last OV 4/25/24. Erx to pharm on file.

## 2024-06-11 NOTE — TELEPHONE ENCOUNTER
Pt is calling requesting  eliquis samples please call pt and let her know if she can come  some samples today

## 2024-10-01 ENCOUNTER — APPOINTMENT (OUTPATIENT)
Dept: CT IMAGING | Age: 79
End: 2024-10-01
Payer: MEDICARE

## 2024-10-01 ENCOUNTER — HOSPITAL ENCOUNTER (EMERGENCY)
Age: 79
Discharge: HOME | End: 2024-10-01
Attending: EMERGENCY MEDICINE
Payer: MEDICARE

## 2024-10-01 VITALS
RESPIRATION RATE: 18 BRPM | DIASTOLIC BLOOD PRESSURE: 99 MMHG | OXYGEN SATURATION: 95 % | SYSTOLIC BLOOD PRESSURE: 155 MMHG | HEIGHT: 65 IN | TEMPERATURE: 97.8 F | WEIGHT: 163.4 LBS | HEART RATE: 55 BPM | BODY MASS INDEX: 27.22 KG/M2

## 2024-10-01 DIAGNOSIS — H81.10 BENIGN PAROXYSMAL POSITIONAL VERTIGO, UNSPECIFIED LATERALITY: Primary | ICD-10-CM

## 2024-10-01 DIAGNOSIS — R00.1 BRADYCARDIA: ICD-10-CM

## 2024-10-01 LAB
ALBUMIN SERPL-MCNC: 3.8 G/DL (ref 3.2–4.6)
ALBUMIN/GLOB SERPL: 1.2 (ref 1–1.9)
ALP SERPL-CCNC: 89 U/L (ref 35–104)
ALT SERPL-CCNC: 13 U/L (ref 8–45)
ANION GAP SERPL CALC-SCNC: 10 MMOL/L (ref 9–18)
AST SERPL-CCNC: 18 U/L (ref 15–37)
BASOPHILS # BLD: 0.1 K/UL (ref 0–0.2)
BASOPHILS NFR BLD: 1 % (ref 0–2)
BILIRUB SERPL-MCNC: 0.4 MG/DL (ref 0–1.2)
BUN SERPL-MCNC: 22 MG/DL (ref 8–23)
CALCIUM SERPL-MCNC: 9.5 MG/DL (ref 8.8–10.2)
CHLORIDE SERPL-SCNC: 105 MMOL/L (ref 98–107)
CO2 SERPL-SCNC: 27 MMOL/L (ref 20–28)
CREAT SERPL-MCNC: 1.02 MG/DL (ref 0.6–1.1)
DIFFERENTIAL METHOD BLD: NORMAL
EKG ATRIAL RATE: 47 BPM
EKG DIAGNOSIS: NORMAL
EKG P AXIS: -42 DEGREES
EKG P-R INTERVAL: 183 MS
EKG Q-T INTERVAL: 481 MS
EKG QRS DURATION: 75 MS
EKG QTC CALCULATION (BAZETT): 426 MS
EKG R AXIS: -1 DEGREES
EKG T AXIS: 177 DEGREES
EKG VENTRICULAR RATE: 47 BPM
EOSINOPHIL # BLD: 0.1 K/UL (ref 0–0.8)
EOSINOPHIL NFR BLD: 2 % (ref 0.5–7.8)
ERYTHROCYTE [DISTWIDTH] IN BLOOD BY AUTOMATED COUNT: 13.2 % (ref 11.9–14.6)
GLOBULIN SER CALC-MCNC: 3.2 G/DL (ref 2.3–3.5)
GLUCOSE SERPL-MCNC: 109 MG/DL (ref 70–99)
HCT VFR BLD AUTO: 42.4 % (ref 35.8–46.3)
HGB BLD-MCNC: 14.2 G/DL (ref 11.7–15.4)
IMM GRANULOCYTES # BLD AUTO: 0 K/UL (ref 0–0.5)
IMM GRANULOCYTES NFR BLD AUTO: 0 % (ref 0–5)
LYMPHOCYTES # BLD: 0.8 K/UL (ref 0.5–4.6)
LYMPHOCYTES NFR BLD: 13 % (ref 13–44)
MAGNESIUM SERPL-MCNC: 2.2 MG/DL (ref 1.8–2.4)
MCH RBC QN AUTO: 30.3 PG (ref 26.1–32.9)
MCHC RBC AUTO-ENTMCNC: 33.5 G/DL (ref 31.4–35)
MCV RBC AUTO: 90.6 FL (ref 82–102)
MONOCYTES # BLD: 0.4 K/UL (ref 0.1–1.3)
MONOCYTES NFR BLD: 7 % (ref 4–12)
NEUTS SEG # BLD: 4.8 K/UL (ref 1.7–8.2)
NEUTS SEG NFR BLD: 76 % (ref 43–78)
NRBC # BLD: 0 K/UL (ref 0–0.2)
PLATELET # BLD AUTO: 300 K/UL (ref 150–450)
PMV BLD AUTO: 10.1 FL (ref 9.4–12.3)
POTASSIUM SERPL-SCNC: 3.9 MMOL/L (ref 3.5–5.1)
PROT SERPL-MCNC: 7 G/DL (ref 6.3–8.2)
RBC # BLD AUTO: 4.68 M/UL (ref 4.05–5.2)
SODIUM SERPL-SCNC: 142 MMOL/L (ref 136–145)
WBC # BLD AUTO: 6.2 K/UL (ref 4.3–11.1)

## 2024-10-01 PROCEDURE — 6370000000 HC RX 637 (ALT 250 FOR IP): Performed by: EMERGENCY MEDICINE

## 2024-10-01 PROCEDURE — 70498 CT ANGIOGRAPHY NECK: CPT

## 2024-10-01 PROCEDURE — 6360000004 HC RX CONTRAST MEDICATION: Performed by: EMERGENCY MEDICINE

## 2024-10-01 PROCEDURE — 80053 COMPREHEN METABOLIC PANEL: CPT

## 2024-10-01 PROCEDURE — 70498 CT ANGIOGRAPHY NECK: CPT | Performed by: RADIOLOGY

## 2024-10-01 PROCEDURE — 70496 CT ANGIOGRAPHY HEAD: CPT | Performed by: RADIOLOGY

## 2024-10-01 PROCEDURE — 70450 CT HEAD/BRAIN W/O DYE: CPT

## 2024-10-01 PROCEDURE — 99285 EMERGENCY DEPT VISIT HI MDM: CPT

## 2024-10-01 PROCEDURE — 93005 ELECTROCARDIOGRAM TRACING: CPT | Performed by: EMERGENCY MEDICINE

## 2024-10-01 PROCEDURE — 93010 ELECTROCARDIOGRAM REPORT: CPT | Performed by: INTERNAL MEDICINE

## 2024-10-01 PROCEDURE — 83735 ASSAY OF MAGNESIUM: CPT

## 2024-10-01 PROCEDURE — 2580000003 HC RX 258: Performed by: EMERGENCY MEDICINE

## 2024-10-01 PROCEDURE — 85025 COMPLETE CBC W/AUTO DIFF WBC: CPT

## 2024-10-01 RX ORDER — IOPAMIDOL 755 MG/ML
100 INJECTION, SOLUTION INTRAVASCULAR
Status: COMPLETED | OUTPATIENT
Start: 2024-10-01 | End: 2024-10-01

## 2024-10-01 RX ORDER — 0.9 % SODIUM CHLORIDE 0.9 %
500 INTRAVENOUS SOLUTION INTRAVENOUS ONCE
Status: COMPLETED | OUTPATIENT
Start: 2024-10-01 | End: 2024-10-01

## 2024-10-01 RX ORDER — MECLIZINE HYDROCHLORIDE 25 MG/1
12.5 TABLET ORAL
Status: COMPLETED | OUTPATIENT
Start: 2024-10-01 | End: 2024-10-01

## 2024-10-01 RX ORDER — MECLIZINE HCL 12.5 MG 12.5 MG/1
12.5 TABLET ORAL 3 TIMES DAILY PRN
Qty: 15 TABLET | Refills: 0 | Status: SHIPPED | OUTPATIENT
Start: 2024-10-01 | End: 2024-10-06

## 2024-10-01 RX ADMIN — IOPAMIDOL 60 ML: 755 INJECTION, SOLUTION INTRAVENOUS at 15:44

## 2024-10-01 RX ADMIN — MECLIZINE HYDROCHLORIDE 12.5 MG: 25 TABLET ORAL at 12:39

## 2024-10-01 RX ADMIN — SODIUM CHLORIDE 500 ML: 9 INJECTION, SOLUTION INTRAVENOUS at 14:39

## 2024-10-01 ASSESSMENT — PAIN SCALES - GENERAL: PAINLEVEL_OUTOF10: 0

## 2024-10-01 ASSESSMENT — LIFESTYLE VARIABLES
HOW OFTEN DO YOU HAVE A DRINK CONTAINING ALCOHOL: NEVER
HOW MANY STANDARD DRINKS CONTAINING ALCOHOL DO YOU HAVE ON A TYPICAL DAY: PATIENT DOES NOT DRINK

## 2024-10-01 ASSESSMENT — ENCOUNTER SYMPTOMS
NAUSEA: 0
VOMITING: 0
SHORTNESS OF BREATH: 0
ABDOMINAL PAIN: 0
COUGH: 0
BLOOD IN STOOL: 0

## 2024-10-01 ASSESSMENT — PAIN - FUNCTIONAL ASSESSMENT: PAIN_FUNCTIONAL_ASSESSMENT: NONE - DENIES PAIN

## 2024-10-01 NOTE — DISCHARGE INSTRUCTIONS
Medication for dizziness when symptoms occur.  If symptoms are prolonged or associated with inability to walk or double vision or numbness or weakness in arm or leg, please return to the emergency department.  Also call your cardiologist for appointment to recheck.  Your heart rate is a bit slower than usual and he may need to adjust medications.

## 2024-10-01 NOTE — ED TRIAGE NOTES
Patient states she woke up this morning, went to the bathroom, started getting ready for the day and became very dizzy. This was around 0730 this morning. So she took a nap and when she woke up she felt worse; extreme dizziness, face tingling, and \"felt so weak\". Daughter-in-law came over and her BP was 180/100. EMS was called and did an EKG and transported pt here.     EMS vitals:  /80  HR 54-58 Sinus Wilver on the monitor  98% RMA

## 2024-10-01 NOTE — ED PROVIDER NOTES
Emergency Department Provider Note       PCP: Kelly Smith MD   Age: 79 y.o.   Sex: female     DISPOSITION Decision To Discharge 10/01/2024 04:25:57 PM  Condition at Disposition: Data Unavailable       ICD-10-CM    1. Benign paroxysmal positional vertigo, unspecified laterality  H81.10       2. Bradycardia  R00.1           Medical Decision Making   Dizziness that appears to be more vertiginous than lightheadedness.  Has some properties of peripheral vertigo and that head movement seems to exacerbate symptoms.  The patient states she does not feel quite right even laying still.  Check EKG.  Check electrolytes.  Check for anemia or blood sugar issues.  Head CT.  Further workup depending on reexamination.  Possible neurology consultation  4:26 PM  Patient feeling much better.  Was able to get up and ambulate around to the bathroom.  She did have some orthostatic changes.  Will give some IV fluids.  Given history of lacunar infarct on CT that is old, will get CT angio to double check posterior vasculature.     1 or more acute illnesses that pose a threat to life or bodily function.   Prescription drug management performed.  Chronic medical problems impacting care include hypertension.  I independently ordered and reviewed each unique test.    I reviewed external records: provider visit note from outside specialist.  Cardiology office visits earlier this year due to paroxysmal atrial fibrillation and hypertension  The patients assessment required an independent historian: EMS.  The reason they were needed is important historical information not provided by the patient.    I interpreted the CT Scan CT of the head without acute changes no bleeding..  ED provider's independent EKG interpretation my interpretation EKG shows sinus rhythm 47.  No ST-T changes.  No ectopy.  Normal QT interval      History     79-year-old female is brought in by EMS.  Patient states she was fine when she went to bed last

## 2024-10-01 NOTE — ED NOTES
Trial Ambulated pt 30ft before returning to pts room. No complaints of dizziness, lightheadedness.

## 2024-10-29 ENCOUNTER — OFFICE VISIT (OUTPATIENT)
Age: 79
End: 2024-10-29

## 2024-10-29 VITALS
DIASTOLIC BLOOD PRESSURE: 90 MMHG | HEART RATE: 60 BPM | BODY MASS INDEX: 26.66 KG/M2 | WEIGHT: 160 LBS | HEIGHT: 65 IN | SYSTOLIC BLOOD PRESSURE: 140 MMHG

## 2024-10-29 DIAGNOSIS — E78.2 MIXED HYPERLIPIDEMIA: ICD-10-CM

## 2024-10-29 DIAGNOSIS — I10 PRIMARY HYPERTENSION: ICD-10-CM

## 2024-10-29 DIAGNOSIS — I48.0 PAROXYSMAL ATRIAL FIBRILLATION (HCC): Primary | ICD-10-CM

## 2024-10-29 RX ORDER — PRAVASTATIN SODIUM 40 MG
40 TABLET ORAL DAILY
Qty: 90 TABLET | Refills: 3 | Status: SHIPPED | OUTPATIENT
Start: 2024-10-29

## 2024-10-29 RX ORDER — VALSARTAN AND HYDROCHLOROTHIAZIDE 80; 12.5 MG/1; MG/1
2 TABLET, FILM COATED ORAL DAILY
Qty: 180 TABLET | Refills: 3 | Status: SHIPPED | OUTPATIENT
Start: 2024-10-29

## 2024-10-29 NOTE — PROGRESS NOTES
report any signs of unusual bleeding or bruising.    Ultimately the goal of anticoagulation therapy is to protect you from serious complications of atrial fibrillation while managing any potential risks in a controlled manner.  We will continue to assess your situation regularly and adjust her treatment as needed to ensure the best possible outcomes.    Following a positive screening for peripheral arterial disease performing an JOHN duplex is a critical neck step for confirming the diagnosis.  The JOHN is a noninvasive reliable tool to assess the severity of arterial blockages and to guide further management.  Combining JOHN with duplex ultrasound enhances diagnostic accuracy by visualizing the arterial system thus providing a comprehensive evaluation of both flow dynamics in the presence of any arterial stenosis.  Early detection and accurate assessment through these tools are essential for preventing disease progression and improving outcomes.  Patient has undergone thorough history physical examination and review of systems including E and M time management of 40 minutes to [place] [address] billing criteria.  Patient will be offered and JOHN duplex will be ordered for abnormal screening/physical exam/review of systems.    Return in approximately 3 months where we can assess the efficacy of the increased antihypertensive therapy and increased statin therapy    Plan    1.  Continue current medical regimen the patient will continue on medications as updated and documented in current office note.  Reinforced the importance of adherence to the current regimen to manage cardiovascular risk factors effectively      2.  Lifestyle modifications emphasized ongoing importance of a heart healthy diet regular physical activity and smoking cessation if appropriate.  The patient was advised to maintain a reasonable low-sodium diet and help manage blood pressure and reduce cardiovascular risk.  If possible up to 150

## 2024-11-04 ENCOUNTER — TELEPHONE (OUTPATIENT)
Age: 79
End: 2024-11-04

## 2024-11-16 ENCOUNTER — APPOINTMENT (OUTPATIENT)
Dept: GENERAL RADIOLOGY | Age: 79
DRG: 871 | End: 2024-11-16
Payer: MEDICARE

## 2024-11-16 ENCOUNTER — HOSPITAL ENCOUNTER (INPATIENT)
Age: 79
LOS: 3 days | Discharge: HOME | DRG: 871 | End: 2024-11-19
Attending: EMERGENCY MEDICINE | Admitting: INTERNAL MEDICINE
Payer: MEDICARE

## 2024-11-16 DIAGNOSIS — J18.9 PNEUMONIA OF LEFT LOWER LOBE DUE TO INFECTIOUS ORGANISM: ICD-10-CM

## 2024-11-16 DIAGNOSIS — A41.9 SEVERE SEPSIS (HCC): ICD-10-CM

## 2024-11-16 DIAGNOSIS — R65.20 SEVERE SEPSIS (HCC): ICD-10-CM

## 2024-11-16 DIAGNOSIS — J96.01 ACUTE RESPIRATORY FAILURE WITH HYPOXIA: ICD-10-CM

## 2024-11-16 DIAGNOSIS — J18.9 ACUTE PNEUMONIA: Primary | ICD-10-CM

## 2024-11-16 PROBLEM — I48.91 A-FIB (HCC): Status: ACTIVE | Noted: 2024-11-16

## 2024-11-16 PROBLEM — N18.30 CKD (CHRONIC KIDNEY DISEASE) STAGE 3, GFR 30-59 ML/MIN (HCC): Status: ACTIVE | Noted: 2024-11-16

## 2024-11-16 LAB
ALBUMIN SERPL-MCNC: 3.4 G/DL (ref 3.2–4.6)
ALBUMIN/GLOB SERPL: 1.1 (ref 1–1.9)
ALP SERPL-CCNC: 127 U/L (ref 35–104)
ALT SERPL-CCNC: 21 U/L (ref 8–45)
ANION GAP SERPL CALC-SCNC: 14 MMOL/L (ref 7–16)
AST SERPL-CCNC: 21 U/L (ref 15–37)
BASOPHILS # BLD: 0 K/UL (ref 0–0.2)
BASOPHILS NFR BLD: 0 % (ref 0–2)
BILIRUB SERPL-MCNC: 1 MG/DL (ref 0–1.2)
BUN SERPL-MCNC: 22 MG/DL (ref 8–23)
CALCIUM SERPL-MCNC: 9.2 MG/DL (ref 8.8–10.2)
CHLORIDE SERPL-SCNC: 100 MMOL/L (ref 98–107)
CO2 SERPL-SCNC: 24 MMOL/L (ref 20–29)
CREAT SERPL-MCNC: 1.15 MG/DL (ref 0.6–1.1)
DIFFERENTIAL METHOD BLD: ABNORMAL
EKG ATRIAL RATE: 0 BPM
EKG DIAGNOSIS: NORMAL
EKG P AXIS: -42 DEGREES
EKG P-R INTERVAL: 129 MS
EKG Q-T INTERVAL: 478 MS
EKG QRS DURATION: 85 MS
EKG QTC CALCULATION (BAZETT): 531 MS
EKG R AXIS: 37 DEGREES
EKG VENTRICULAR RATE: 74 BPM
EOSINOPHIL # BLD: 0 K/UL (ref 0–0.8)
EOSINOPHIL NFR BLD: 0 % (ref 0.5–7.8)
ERYTHROCYTE [DISTWIDTH] IN BLOOD BY AUTOMATED COUNT: 13 % (ref 11.9–14.6)
FLUAV RNA SPEC QL NAA+PROBE: NOT DETECTED
FLUBV RNA SPEC QL NAA+PROBE: NOT DETECTED
GLOBULIN SER CALC-MCNC: 3.1 G/DL (ref 2.3–3.5)
GLUCOSE SERPL-MCNC: 136 MG/DL (ref 70–99)
HCT VFR BLD AUTO: 36.8 % (ref 35.8–46.3)
HGB BLD-MCNC: 12.7 G/DL (ref 11.7–15.4)
IMM GRANULOCYTES # BLD AUTO: 0.1 K/UL (ref 0–0.5)
IMM GRANULOCYTES NFR BLD AUTO: 1 % (ref 0–5)
LACTATE SERPL-SCNC: 1.9 MMOL/L (ref 0.5–2)
LYMPHOCYTES # BLD: 0.5 K/UL (ref 0.5–4.6)
LYMPHOCYTES NFR BLD: 3 % (ref 13–44)
MAGNESIUM SERPL-MCNC: 2 MG/DL (ref 1.8–2.4)
MCH RBC QN AUTO: 30.8 PG (ref 26.1–32.9)
MCHC RBC AUTO-ENTMCNC: 34.5 G/DL (ref 31.4–35)
MCV RBC AUTO: 89.1 FL (ref 82–102)
MONOCYTES # BLD: 0.9 K/UL (ref 0.1–1.3)
MONOCYTES NFR BLD: 6 % (ref 4–12)
NEUTS SEG # BLD: 12.8 K/UL (ref 1.7–8.2)
NEUTS SEG NFR BLD: 89 % (ref 43–78)
NRBC # BLD: 0 K/UL (ref 0–0.2)
NT PRO BNP: 1571 PG/ML (ref 0–450)
PLATELET # BLD AUTO: 339 K/UL (ref 150–450)
PMV BLD AUTO: 10.2 FL (ref 9.4–12.3)
POTASSIUM SERPL-SCNC: 3.3 MMOL/L (ref 3.5–5.1)
PROCALCITONIN SERPL-MCNC: 2.13 NG/ML (ref 0–0.1)
PROT SERPL-MCNC: 6.5 G/DL (ref 6.3–8.2)
RBC # BLD AUTO: 4.13 M/UL (ref 4.05–5.2)
SARS-COV-2 RDRP RESP QL NAA+PROBE: NOT DETECTED
SODIUM SERPL-SCNC: 138 MMOL/L (ref 136–145)
SOURCE: NORMAL
WBC # BLD AUTO: 14.3 K/UL (ref 4.3–11.1)

## 2024-11-16 PROCEDURE — 96365 THER/PROPH/DIAG IV INF INIT: CPT

## 2024-11-16 PROCEDURE — 1100000000 HC RM PRIVATE

## 2024-11-16 PROCEDURE — 87899 AGENT NOS ASSAY W/OPTIC: CPT

## 2024-11-16 PROCEDURE — 2580000003 HC RX 258: Performed by: INTERNAL MEDICINE

## 2024-11-16 PROCEDURE — 80053 COMPREHEN METABOLIC PANEL: CPT

## 2024-11-16 PROCEDURE — 83605 ASSAY OF LACTIC ACID: CPT

## 2024-11-16 PROCEDURE — 2580000003 HC RX 258: Performed by: EMERGENCY MEDICINE

## 2024-11-16 PROCEDURE — 94761 N-INVAS EAR/PLS OXIMETRY MLT: CPT

## 2024-11-16 PROCEDURE — 85025 COMPLETE CBC W/AUTO DIFF WBC: CPT

## 2024-11-16 PROCEDURE — 83735 ASSAY OF MAGNESIUM: CPT

## 2024-11-16 PROCEDURE — 84145 PROCALCITONIN (PCT): CPT

## 2024-11-16 PROCEDURE — 94760 N-INVAS EAR/PLS OXIMETRY 1: CPT

## 2024-11-16 PROCEDURE — 87040 BLOOD CULTURE FOR BACTERIA: CPT

## 2024-11-16 PROCEDURE — 87502 INFLUENZA DNA AMP PROBE: CPT

## 2024-11-16 PROCEDURE — 71046 X-RAY EXAM CHEST 2 VIEWS: CPT

## 2024-11-16 PROCEDURE — 96374 THER/PROPH/DIAG INJ IV PUSH: CPT

## 2024-11-16 PROCEDURE — 6360000002 HC RX W HCPCS: Performed by: EMERGENCY MEDICINE

## 2024-11-16 PROCEDURE — 6370000000 HC RX 637 (ALT 250 FOR IP): Performed by: EMERGENCY MEDICINE

## 2024-11-16 PROCEDURE — 93005 ELECTROCARDIOGRAM TRACING: CPT | Performed by: EMERGENCY MEDICINE

## 2024-11-16 PROCEDURE — 87635 SARS-COV-2 COVID-19 AMP PRB: CPT

## 2024-11-16 PROCEDURE — 6370000000 HC RX 637 (ALT 250 FOR IP): Performed by: INTERNAL MEDICINE

## 2024-11-16 PROCEDURE — 87449 NOS EACH ORGANISM AG IA: CPT

## 2024-11-16 PROCEDURE — 83880 ASSAY OF NATRIURETIC PEPTIDE: CPT

## 2024-11-16 PROCEDURE — 99285 EMERGENCY DEPT VISIT HI MDM: CPT

## 2024-11-16 PROCEDURE — 96361 HYDRATE IV INFUSION ADD-ON: CPT

## 2024-11-16 PROCEDURE — 2700000000 HC OXYGEN THERAPY PER DAY

## 2024-11-16 PROCEDURE — 93010 ELECTROCARDIOGRAM REPORT: CPT | Performed by: INTERNAL MEDICINE

## 2024-11-16 RX ORDER — ACETAMINOPHEN 325 MG/1
650 TABLET ORAL EVERY 6 HOURS PRN
Status: DISCONTINUED | OUTPATIENT
Start: 2024-11-16 | End: 2024-11-19 | Stop reason: HOSPADM

## 2024-11-16 RX ORDER — MAGNESIUM SULFATE IN WATER 40 MG/ML
2000 INJECTION, SOLUTION INTRAVENOUS PRN
Status: DISCONTINUED | OUTPATIENT
Start: 2024-11-16 | End: 2024-11-19 | Stop reason: HOSPADM

## 2024-11-16 RX ORDER — ONDANSETRON 4 MG/1
4 TABLET, ORALLY DISINTEGRATING ORAL EVERY 8 HOURS PRN
Status: DISCONTINUED | OUTPATIENT
Start: 2024-11-16 | End: 2024-11-19 | Stop reason: HOSPADM

## 2024-11-16 RX ORDER — ONDANSETRON 2 MG/ML
4 INJECTION INTRAMUSCULAR; INTRAVENOUS EVERY 6 HOURS PRN
Status: DISCONTINUED | OUTPATIENT
Start: 2024-11-16 | End: 2024-11-19 | Stop reason: HOSPADM

## 2024-11-16 RX ORDER — PRAVASTATIN SODIUM 20 MG
40 TABLET ORAL DAILY
Status: DISCONTINUED | OUTPATIENT
Start: 2024-11-16 | End: 2024-11-16

## 2024-11-16 RX ORDER — 0.9 % SODIUM CHLORIDE 0.9 %
1000 INTRAVENOUS SOLUTION INTRAVENOUS ONCE
Status: COMPLETED | OUTPATIENT
Start: 2024-11-16 | End: 2024-11-16

## 2024-11-16 RX ORDER — ENOXAPARIN SODIUM 100 MG/ML
40 INJECTION SUBCUTANEOUS DAILY
Status: DISCONTINUED | OUTPATIENT
Start: 2024-11-16 | End: 2024-11-16 | Stop reason: SDUPTHER

## 2024-11-16 RX ORDER — POLYETHYLENE GLYCOL 3350 17 G/17G
17 POWDER, FOR SOLUTION ORAL DAILY PRN
Status: DISCONTINUED | OUTPATIENT
Start: 2024-11-16 | End: 2024-11-19 | Stop reason: HOSPADM

## 2024-11-16 RX ORDER — SODIUM CHLORIDE 0.9 % (FLUSH) 0.9 %
5-40 SYRINGE (ML) INJECTION EVERY 12 HOURS SCHEDULED
Status: DISCONTINUED | OUTPATIENT
Start: 2024-11-16 | End: 2024-11-19 | Stop reason: HOSPADM

## 2024-11-16 RX ORDER — VALSARTAN AND HYDROCHLOROTHIAZIDE 160; 25 MG/1; MG/1
1 TABLET ORAL DAILY
Status: ON HOLD | COMMUNITY
Start: 2024-11-04 | End: 2024-11-19 | Stop reason: HOSPADM

## 2024-11-16 RX ORDER — AMIODARONE HYDROCHLORIDE 200 MG/1
100 TABLET ORAL DAILY
Status: DISCONTINUED | OUTPATIENT
Start: 2024-11-16 | End: 2024-11-19 | Stop reason: HOSPADM

## 2024-11-16 RX ORDER — SODIUM CHLORIDE 0.9 % (FLUSH) 0.9 %
5-40 SYRINGE (ML) INJECTION PRN
Status: DISCONTINUED | OUTPATIENT
Start: 2024-11-16 | End: 2024-11-19 | Stop reason: HOSPADM

## 2024-11-16 RX ORDER — SODIUM CHLORIDE 9 MG/ML
INJECTION, SOLUTION INTRAVENOUS PRN
Status: DISCONTINUED | OUTPATIENT
Start: 2024-11-16 | End: 2024-11-19 | Stop reason: HOSPADM

## 2024-11-16 RX ORDER — GUAIFENESIN 600 MG/1
600 TABLET, EXTENDED RELEASE ORAL 2 TIMES DAILY
Status: DISCONTINUED | OUTPATIENT
Start: 2024-11-16 | End: 2024-11-19 | Stop reason: HOSPADM

## 2024-11-16 RX ORDER — POTASSIUM CHLORIDE 1500 MG/1
40 TABLET, EXTENDED RELEASE ORAL PRN
Status: DISCONTINUED | OUTPATIENT
Start: 2024-11-16 | End: 2024-11-19 | Stop reason: HOSPADM

## 2024-11-16 RX ORDER — BENZONATATE 100 MG/1
100 CAPSULE ORAL 3 TIMES DAILY PRN
Status: DISCONTINUED | OUTPATIENT
Start: 2024-11-16 | End: 2024-11-19 | Stop reason: HOSPADM

## 2024-11-16 RX ORDER — POTASSIUM CHLORIDE 7.45 MG/ML
10 INJECTION INTRAVENOUS PRN
Status: DISCONTINUED | OUTPATIENT
Start: 2024-11-16 | End: 2024-11-19 | Stop reason: HOSPADM

## 2024-11-16 RX ORDER — ACETAMINOPHEN 650 MG/1
650 SUPPOSITORY RECTAL EVERY 6 HOURS PRN
Status: DISCONTINUED | OUTPATIENT
Start: 2024-11-16 | End: 2024-11-19 | Stop reason: HOSPADM

## 2024-11-16 RX ORDER — PRAVASTATIN SODIUM 20 MG
40 TABLET ORAL NIGHTLY
Status: DISCONTINUED | OUTPATIENT
Start: 2024-11-16 | End: 2024-11-19 | Stop reason: HOSPADM

## 2024-11-16 RX ADMIN — POTASSIUM BICARBONATE 40 MEQ: 391 TABLET, EFFERVESCENT ORAL at 11:07

## 2024-11-16 RX ADMIN — ACETAMINOPHEN 650 MG: 325 TABLET, FILM COATED ORAL at 20:21

## 2024-11-16 RX ADMIN — GUAIFENESIN 600 MG: 600 TABLET, EXTENDED RELEASE ORAL at 20:21

## 2024-11-16 RX ADMIN — SODIUM CHLORIDE 1000 ML: 9 INJECTION, SOLUTION INTRAVENOUS at 11:00

## 2024-11-16 RX ADMIN — SODIUM CHLORIDE, PRESERVATIVE FREE 10 ML: 5 INJECTION INTRAVENOUS at 20:28

## 2024-11-16 RX ADMIN — PRAVASTATIN SODIUM 40 MG: 20 TABLET ORAL at 21:44

## 2024-11-16 RX ADMIN — APIXABAN 5 MG: 5 TABLET, FILM COATED ORAL at 20:21

## 2024-11-16 RX ADMIN — AZITHROMYCIN MONOHYDRATE 500 MG: 500 INJECTION, POWDER, LYOPHILIZED, FOR SOLUTION INTRAVENOUS at 10:31

## 2024-11-16 RX ADMIN — GUAIFENESIN 600 MG: 600 TABLET, EXTENDED RELEASE ORAL at 13:57

## 2024-11-16 RX ADMIN — CEFTRIAXONE 1000 MG: 1 INJECTION, POWDER, FOR SOLUTION INTRAMUSCULAR; INTRAVENOUS at 10:16

## 2024-11-16 RX ADMIN — SODIUM CHLORIDE 1000 ML: 9 INJECTION, SOLUTION INTRAVENOUS at 11:35

## 2024-11-16 ASSESSMENT — PAIN SCALES - GENERAL: PAINLEVEL_OUTOF10: 3

## 2024-11-16 ASSESSMENT — PAIN DESCRIPTION - DESCRIPTORS: DESCRIPTORS: ACHING

## 2024-11-16 ASSESSMENT — PAIN DESCRIPTION - LOCATION: LOCATION: HEAD

## 2024-11-16 NOTE — H&P
Janel        Signed:  Cezar Morris MD    Part of this note may have been written by using a voice dictation software.  The note has been proof read but may still contain some grammatical/other typographical errors.

## 2024-11-16 NOTE — ACP (ADVANCE CARE PLANNING)
Advance Care Planning   General Advance Care Planning (ACP) Conversation    Date of Conversation: 11/16/2024  Conducted with: Patient with Decision Making Capacity  Other persons present: None    Healthcare Decision Maker:  No healthcare decision makers have been documented.    Today we documented Decision Maker(s) consistent with Legal Next of Kin hierarchy.  Patient would like her  and son to make healthcare decisions. Patient states Oseas Leger, son should be made primary, as  has experienced short term memory loss after stroke.     Content/Action Overview:  Has NO ACP documents-Information provided      Length of Voluntary ACP Conversation in minutes:  <16 minutes (Non-Billable)    Essie Kee RN

## 2024-11-16 NOTE — ED TRIAGE NOTES
Pt CO SOB, \"tickle\" in throat, productive cough, and fatigue x2 week. Pt states she had x3 total episodes of vomiting. Pt also advises that she had dark colored diarrhea this morning. Pt unsure if she has had a fever. Pt takes Eliquis.

## 2024-11-16 NOTE — CARE COORDINATION
RNCM met with patient in room 349 to discuss discharge planning.     Patient presents to assessment alert and oriented, and answers questions appropriately.  Patient typically lives at home with spouse. Son of patient lives in separate area of the home with his wife and children.  At baseline, patient is independent and does not use assistive devices.    Demographics verified. Patient has [employer] Medicare.  PCP is Dr. Ragland.     Case management will continue to follow for ongoing discharge planning needs.  Please notify if there are any changes.          11/16/24 1700   Service Assessment   Patient Orientation Alert and Oriented;Person;Place;Situation;Self   Cognition Alert   History Provided By Patient   Primary Caregiver Self   Accompanied By/Relationship n/a   Support Systems Spouse/Significant Other;Children   Patient's Healthcare Decision Maker is: Legal Next of Kin  (SonOseas [phone])   PCP Verified by CM Yes   Last Visit to PCP Within last 6 months   Prior Functional Level Independent in ADLs/IADLs   Current Functional Level Independent in ADLs/IADLs   Can patient return to prior living arrangement Yes   Ability to make needs known: Good   Family able to assist with home care needs: Yes   Would you like for me to discuss the discharge plan with any other family members/significant others, and if so, who? No   Financial Resources Medicare   Community Resources None   Social/Functional History   Lives With Spouse   Type of Home House   Home Layout One level   ADL Assistance Independent   Homemaking Assistance Independent   Ambulation Assistance Independent   Transfer Assistance Independent   Active  Yes   Mode of Transportation Car   Occupation Retired   Discharge Planning   Type of Residence House   Living Arrangements Spouse/Significant Other;Children   Current Services Prior To Admission None   Potential Assistance Needed N/A   DME Ordered? No   Potential Assistance Purchasing

## 2024-11-16 NOTE — ED PROVIDER NOTES
Emergency Department Provider Note       PCP: Kelly Smith MD   Age: 79 y.o.   Sex: female     DISPOSITION Decision To Admit 11/16/2024 11:11:41 AM    ICD-10-CM    1. Acute respiratory failure with hypoxia  J96.01       2. Pneumonia of left lower lobe due to infectious organism  J18.9       3. Severe sepsis (HCC)  A41.9     R65.20           Medical Decision Making     Patient's chest x-ray is consistent with pneumonia.  Walking O2 sat shows hypoxia to 87%.  Patient will need admission.  Sepsis workup was obtained.  Reviewed patient's chart.  She sees Dr. Weeks for A-fib and hypertension which have both been well-controlled.    Pt with mild RADHA and soft pressures.  Given 30 ml/kg IVFs.  Will admit.       1 or more acute illnesses that pose a threat to life or bodily function.   Prescription drug management performed.  Discussion with external consultants.  Shared medical decision making was utilized in creating the patients health plan today.  I independently ordered and reviewed each unique test.    I reviewed external records: provider visit note from outside specialist.  I reviewed external records: previous lab results from outside ED.     ED cardiac monitoring rhythm strip was ordered and interpreted:  atrial fibrillation rate controlled  ST Segments:Nonspecific ST segments - NO STEMI   Rate: 72  I interpreted the X-rays LLL PNA.  ED provider's independent EKG interpretation A-fib no ST elevation normal QRS normal rate  The patient was admitted and I have discussed patient management with the admitting provider.Cassie    SEP-1 CORE MEASURE    Fluid Resuscitation Rational: at least 30mL/kg based on entered actual weight at time of evaluation  (2 L plus 250 cc abx)    Repeat Lactate Level: not indicated due to initial lactate < 2    Reassessment Exam: I completed the sepsis fluid reassessment on 11/16/24 11:08 AM EST.     Critical Care Procedure Note: 52 minutes of critical care time

## 2024-11-17 LAB
ANION GAP SERPL CALC-SCNC: 10 MMOL/L (ref 7–16)
BASOPHILS # BLD: 0 K/UL (ref 0–0.2)
BASOPHILS NFR BLD: 0 % (ref 0–2)
BUN SERPL-MCNC: 24 MG/DL (ref 8–23)
CALCIUM SERPL-MCNC: 9 MG/DL (ref 8.8–10.2)
CHLORIDE SERPL-SCNC: 106 MMOL/L (ref 98–107)
CO2 SERPL-SCNC: 24 MMOL/L (ref 20–29)
CREAT SERPL-MCNC: 1.11 MG/DL (ref 0.6–1.1)
DIFFERENTIAL METHOD BLD: ABNORMAL
EOSINOPHIL # BLD: 0.1 K/UL (ref 0–0.8)
EOSINOPHIL NFR BLD: 1 % (ref 0.5–7.8)
ERYTHROCYTE [DISTWIDTH] IN BLOOD BY AUTOMATED COUNT: 13.2 % (ref 11.9–14.6)
GLUCOSE SERPL-MCNC: 106 MG/DL (ref 70–99)
HCT VFR BLD AUTO: 32.6 % (ref 35.8–46.3)
HGB BLD-MCNC: 10.9 G/DL (ref 11.7–15.4)
IMM GRANULOCYTES # BLD AUTO: 0 K/UL (ref 0–0.5)
IMM GRANULOCYTES NFR BLD AUTO: 0 % (ref 0–5)
LYMPHOCYTES # BLD: 0.8 K/UL (ref 0.5–4.6)
LYMPHOCYTES NFR BLD: 9 % (ref 13–44)
MCH RBC QN AUTO: 30.3 PG (ref 26.1–32.9)
MCHC RBC AUTO-ENTMCNC: 33.4 G/DL (ref 31.4–35)
MCV RBC AUTO: 90.6 FL (ref 82–102)
MONOCYTES # BLD: 0.8 K/UL (ref 0.1–1.3)
MONOCYTES NFR BLD: 8 % (ref 4–12)
NEUTS SEG # BLD: 7.6 K/UL (ref 1.7–8.2)
NEUTS SEG NFR BLD: 82 % (ref 43–78)
NRBC # BLD: 0 K/UL (ref 0–0.2)
PLATELET # BLD AUTO: 282 K/UL (ref 150–450)
PMV BLD AUTO: 10.4 FL (ref 9.4–12.3)
POTASSIUM SERPL-SCNC: 3.6 MMOL/L (ref 3.5–5.1)
RBC # BLD AUTO: 3.6 M/UL (ref 4.05–5.2)
SODIUM SERPL-SCNC: 140 MMOL/L (ref 136–145)
WBC # BLD AUTO: 9.2 K/UL (ref 4.3–11.1)

## 2024-11-17 PROCEDURE — 6370000000 HC RX 637 (ALT 250 FOR IP): Performed by: INTERNAL MEDICINE

## 2024-11-17 PROCEDURE — 80048 BASIC METABOLIC PNL TOTAL CA: CPT

## 2024-11-17 PROCEDURE — 2580000003 HC RX 258: Performed by: INTERNAL MEDICINE

## 2024-11-17 PROCEDURE — 36415 COLL VENOUS BLD VENIPUNCTURE: CPT

## 2024-11-17 PROCEDURE — 6360000002 HC RX W HCPCS: Performed by: INTERNAL MEDICINE

## 2024-11-17 PROCEDURE — 1100000000 HC RM PRIVATE

## 2024-11-17 PROCEDURE — 85025 COMPLETE CBC W/AUTO DIFF WBC: CPT

## 2024-11-17 RX ADMIN — SODIUM CHLORIDE, PRESERVATIVE FREE 10 ML: 5 INJECTION INTRAVENOUS at 08:27

## 2024-11-17 RX ADMIN — APIXABAN 5 MG: 5 TABLET, FILM COATED ORAL at 22:16

## 2024-11-17 RX ADMIN — AMIODARONE HYDROCHLORIDE 100 MG: 200 TABLET ORAL at 22:17

## 2024-11-17 RX ADMIN — BENZONATATE 100 MG: 100 CAPSULE ORAL at 16:55

## 2024-11-17 RX ADMIN — PRAVASTATIN SODIUM 40 MG: 20 TABLET ORAL at 22:16

## 2024-11-17 RX ADMIN — GUAIFENESIN 600 MG: 600 TABLET, EXTENDED RELEASE ORAL at 22:16

## 2024-11-17 RX ADMIN — BENZONATATE 100 MG: 100 CAPSULE ORAL at 10:49

## 2024-11-17 RX ADMIN — CEFTRIAXONE 1000 MG: 1 INJECTION, POWDER, FOR SOLUTION INTRAMUSCULAR; INTRAVENOUS at 10:50

## 2024-11-17 RX ADMIN — AZITHROMYCIN MONOHYDRATE 500 MG: 500 INJECTION, POWDER, LYOPHILIZED, FOR SOLUTION INTRAVENOUS at 10:56

## 2024-11-17 RX ADMIN — GUAIFENESIN 600 MG: 600 TABLET, EXTENDED RELEASE ORAL at 08:27

## 2024-11-17 RX ADMIN — APIXABAN 5 MG: 5 TABLET, FILM COATED ORAL at 08:27

## 2024-11-17 RX ADMIN — SODIUM CHLORIDE, PRESERVATIVE FREE 10 ML: 5 INJECTION INTRAVENOUS at 22:18

## 2024-11-17 ASSESSMENT — PAIN SCALES - GENERAL: PAINLEVEL_OUTOF10: 0

## 2024-11-17 NOTE — PLAN OF CARE
Problem: Discharge Planning  Goal: Discharge to home or other facility with appropriate resources  11/17/2024 0929 by Flower Juan RN  Outcome: Progressing  11/16/2024 2039 by Bhumika Guajardo RN  Outcome: Progressing     Problem: Respiratory - Adult  Goal: Achieves optimal ventilation and oxygenation  11/17/2024 0929 by Flower Juan RN  Outcome: Progressing  11/16/2024 2039 by Bhumika Guajardo RN  Outcome: Progressing     Problem: Infection - Adult  Goal: Absence of infection at discharge  11/17/2024 0929 by Flower Juan RN  Outcome: Progressing  11/16/2024 2039 by Bhumika Guajardo RN  Outcome: Progressing     Problem: Safety - Adult  Goal: Free from fall injury  Outcome: Progressing

## 2024-11-17 NOTE — RT PROTOCOL NOTE
arrhythmias, and are ordered Xopenex and Atrovent may be changed to Duoneb.      VII. Safety:        The following safety issues shall be monitored:  The RCP will perform hand hygiene per hospital policy utilizing Standard Precautions for all patients and following transmission-based isolation as indicated per hospital policy.  The RCP must exercise professional judgment in classifying the patient for frequency of therapy.  Appropriate classification of the patient will require an evaluation utilizing the Therapy Assessment Protocol Guidelines.  The RCP will confer with the physician concerning the care of the patient at any time questions or problems arise.  If during therapy, the patient exhibits no improvement or deterioration in clinical status the RCP will notify the physician and the patient’s nurse.      VIII. Interventions:   The patient’s nurse is responsible concerning all items related to his/her care. Ongoing communication with nursing is essential to successful protocol management.  The RCP recognizes the value of the team approach in meeting the patient’s needs. Nursing input regarding the patient’s pulmonary condition will be sought as needed.     IX. Reportable conditions:   The RCP will inform the physician if:  There are acute changes in patient’s respiratory status.  The therapist is unable to determine appropriate care plan upon assessment.  The patient fails to reach therapeutic objective.  A change or additional medication is needed.    X.  Patient Education:    Patient will receive instruction on the following:  The treatment modality, including objectives and proper technique of therapy  Respiratory medications  Documentation shall occur in the “patient education” section of the patient’s EMR.    XI. Documentation: Record all findings as described above in the patient’s EMR.    Related Protocols: A. Aerosolized Medication Protocol  Bronchial Hygiene  Oxygen Protocol   Volume

## 2024-11-17 NOTE — CARE COORDINATION
CM note:    Chart reviewed for updates. Pt is still pending medical clearance. She is on O2 at the hospital. No therapy consults at this time. Plan is to discharge home with family support pending medical clearance. CM will continue to follow up with d/c planning.

## 2024-11-17 NOTE — PLAN OF CARE
Problem: Discharge Planning  Goal: Discharge to home or other facility with appropriate resources  11/17/2024 0929 by Flower Juan RN  Outcome: Progressing  11/17/2024 0929 by Flower Juan RN  Outcome: Progressing  11/16/2024 2039 by Bhumika Guajardo RN  Outcome: Progressing     Problem: Respiratory - Adult  Goal: Achieves optimal ventilation and oxygenation  11/17/2024 0929 by Flower Juan RN  Outcome: Progressing  11/17/2024 0929 by Flower Juan RN  Outcome: Progressing  11/16/2024 2039 by Bhumika Guajardo RN  Outcome: Progressing     Problem: Infection - Adult  Goal: Absence of infection at discharge  11/17/2024 0929 by Flower Juan RN  Outcome: Progressing  11/17/2024 0929 by Flower Juan RN  Outcome: Progressing  11/16/2024 2039 by Bhumika Guajardo RN  Outcome: Progressing     Problem: Safety - Adult  Goal: Free from fall injury  11/17/2024 0929 by Flower Juan RN  Outcome: Progressing  11/17/2024 0929 by Flower Juan RN  Outcome: Progressing

## 2024-11-18 PROCEDURE — 2580000003 HC RX 258: Performed by: INTERNAL MEDICINE

## 2024-11-18 PROCEDURE — 6370000000 HC RX 637 (ALT 250 FOR IP): Performed by: FAMILY MEDICINE

## 2024-11-18 PROCEDURE — 94761 N-INVAS EAR/PLS OXIMETRY MLT: CPT

## 2024-11-18 PROCEDURE — 94760 N-INVAS EAR/PLS OXIMETRY 1: CPT

## 2024-11-18 PROCEDURE — 2700000000 HC OXYGEN THERAPY PER DAY

## 2024-11-18 PROCEDURE — 6370000000 HC RX 637 (ALT 250 FOR IP): Performed by: INTERNAL MEDICINE

## 2024-11-18 PROCEDURE — 6360000002 HC RX W HCPCS: Performed by: INTERNAL MEDICINE

## 2024-11-18 PROCEDURE — 1100000000 HC RM PRIVATE

## 2024-11-18 RX ORDER — AZITHROMYCIN 250 MG/1
500 TABLET, FILM COATED ORAL ONCE
Status: COMPLETED | OUTPATIENT
Start: 2024-11-19 | End: 2024-11-19

## 2024-11-18 RX ORDER — DIPHENHYDRAMINE HCL 25 MG
25 CAPSULE ORAL ONCE
Status: COMPLETED | OUTPATIENT
Start: 2024-11-18 | End: 2024-11-18

## 2024-11-18 RX ORDER — BENZONATATE 100 MG/1
100 CAPSULE ORAL 3 TIMES DAILY PRN
Qty: 30 CAPSULE | Refills: 0 | Status: CANCELLED | OUTPATIENT
Start: 2024-11-18 | End: 2024-11-28

## 2024-11-18 RX ADMIN — APIXABAN 5 MG: 5 TABLET, FILM COATED ORAL at 08:13

## 2024-11-18 RX ADMIN — AMIODARONE HYDROCHLORIDE 100 MG: 200 TABLET ORAL at 20:10

## 2024-11-18 RX ADMIN — AZITHROMYCIN MONOHYDRATE 500 MG: 500 INJECTION, POWDER, LYOPHILIZED, FOR SOLUTION INTRAVENOUS at 14:06

## 2024-11-18 RX ADMIN — GUAIFENESIN 600 MG: 600 TABLET, EXTENDED RELEASE ORAL at 20:10

## 2024-11-18 RX ADMIN — APIXABAN 5 MG: 5 TABLET, FILM COATED ORAL at 20:10

## 2024-11-18 RX ADMIN — GUAIFENESIN 600 MG: 600 TABLET, EXTENDED RELEASE ORAL at 08:14

## 2024-11-18 RX ADMIN — CEFTRIAXONE 1000 MG: 1 INJECTION, POWDER, FOR SOLUTION INTRAMUSCULAR; INTRAVENOUS at 14:03

## 2024-11-18 RX ADMIN — BENZONATATE 100 MG: 100 CAPSULE ORAL at 01:57

## 2024-11-18 RX ADMIN — DIPHENHYDRAMINE HYDROCHLORIDE 25 MG: 25 CAPSULE ORAL at 21:28

## 2024-11-18 RX ADMIN — SODIUM CHLORIDE, PRESERVATIVE FREE 10 ML: 5 INJECTION INTRAVENOUS at 14:08

## 2024-11-18 RX ADMIN — SODIUM CHLORIDE, PRESERVATIVE FREE 10 ML: 5 INJECTION INTRAVENOUS at 20:10

## 2024-11-18 RX ADMIN — BENZONATATE 100 MG: 100 CAPSULE ORAL at 20:10

## 2024-11-18 RX ADMIN — PRAVASTATIN SODIUM 40 MG: 20 TABLET ORAL at 20:10

## 2024-11-18 NOTE — DISCHARGE INSTRUCTIONS
Follow-up with primary care provider  If any worsening shortness of breath, fevers or chills recommend returning to emergency department  Continue antibiotics upon discharge

## 2024-11-18 NOTE — CARE COORDINATION
Pt chart reviewed and discussed in AM IDR for continued stay. LOS 2 days. Pt admitted with acute pneumonia. Per MD, pt SOB; one more day of IV ABX; 6 minute walk-test for home oxygen qualifier; potential discharge tomorrow.     CM to provide HH choice list if home 02 needed for RN to follow.    DC/POC to return home with family when medically stable.    CM team will continue to follow for potential discharge needs that may arise.         Leanne Acosta, MSW, LBSW    [facility]

## 2024-11-18 NOTE — PLAN OF CARE
Problem: Safety - Adult  Goal: Free from fall injury  Outcome: Progressing     Problem: Infection - Adult  Goal: Absence of infection at discharge  Outcome: Progressing     Problem: Respiratory - Adult  Goal: Achieves optimal ventilation and oxygenation  Outcome: Progressing     Problem: Discharge Planning  Goal: Discharge to home or other facility with appropriate resources  Outcome: Progressing

## 2024-11-18 NOTE — PLAN OF CARE
Problem: Discharge Planning  Goal: Discharge to home or other facility with appropriate resources  11/18/2024 1048 by Preet Patel RN  Outcome: Progressing  11/18/2024 0045 by Darcy Lucas RN  Outcome: Progressing     Problem: Respiratory - Adult  Goal: Achieves optimal ventilation and oxygenation  11/18/2024 1048 by Preet Patel RN  Outcome: Progressing  11/18/2024 0045 by Darcy Lucas RN  Outcome: Progressing     Problem: Infection - Adult  Goal: Absence of infection at discharge  11/18/2024 1048 by Preet Patel RN  Outcome: Progressing  11/18/2024 0045 by Darcy Lucas RN  Outcome: Progressing     Problem: Safety - Adult  Goal: Free from fall injury  11/18/2024 1048 by Preet Patel RN  Outcome: Progressing  11/18/2024 0045 by Darcy Lucas RN  Outcome: Progressing

## 2024-11-19 VITALS
DIASTOLIC BLOOD PRESSURE: 74 MMHG | SYSTOLIC BLOOD PRESSURE: 152 MMHG | HEART RATE: 50 BPM | RESPIRATION RATE: 18 BRPM | HEIGHT: 65 IN | OXYGEN SATURATION: 94 % | WEIGHT: 159 LBS | TEMPERATURE: 98.2 F | BODY MASS INDEX: 26.49 KG/M2

## 2024-11-19 PROCEDURE — 2580000003 HC RX 258: Performed by: INTERNAL MEDICINE

## 2024-11-19 PROCEDURE — 6370000000 HC RX 637 (ALT 250 FOR IP): Performed by: INTERNAL MEDICINE

## 2024-11-19 PROCEDURE — 6360000002 HC RX W HCPCS

## 2024-11-19 RX ORDER — DOXYCYCLINE HYCLATE 100 MG
100 TABLET ORAL 2 TIMES DAILY
Qty: 4 TABLET | Refills: 0 | Status: SHIPPED | OUTPATIENT
Start: 2024-11-19 | End: 2024-11-21

## 2024-11-19 RX ORDER — FUROSEMIDE 10 MG/ML
40 INJECTION INTRAMUSCULAR; INTRAVENOUS ONCE
Status: COMPLETED | OUTPATIENT
Start: 2024-11-19 | End: 2024-11-19

## 2024-11-19 RX ORDER — ALBUTEROL SULFATE 90 UG/1
2 INHALANT RESPIRATORY (INHALATION) 4 TIMES DAILY PRN
Qty: 8.5 DEVICE | Refills: 0 | Status: SHIPPED | OUTPATIENT
Start: 2024-11-19

## 2024-11-19 RX ORDER — CEFDINIR 300 MG/1
300 CAPSULE ORAL 2 TIMES DAILY
Qty: 4 CAPSULE | Refills: 0 | Status: SHIPPED | OUTPATIENT
Start: 2024-11-19 | End: 2024-11-21

## 2024-11-19 RX ORDER — GUAIFENESIN 600 MG/1
600 TABLET, EXTENDED RELEASE ORAL 2 TIMES DAILY
Qty: 14 TABLET | Refills: 0 | Status: SHIPPED | OUTPATIENT
Start: 2024-11-19 | End: 2024-11-26

## 2024-11-19 RX ORDER — BENZONATATE 100 MG/1
100 CAPSULE ORAL 3 TIMES DAILY PRN
Qty: 21 CAPSULE | Refills: 0 | Status: SHIPPED | OUTPATIENT
Start: 2024-11-19 | End: 2024-11-26

## 2024-11-19 RX ADMIN — FUROSEMIDE 40 MG: 10 INJECTION, SOLUTION INTRAMUSCULAR; INTRAVENOUS at 09:10

## 2024-11-19 RX ADMIN — GUAIFENESIN 600 MG: 600 TABLET, EXTENDED RELEASE ORAL at 09:01

## 2024-11-19 RX ADMIN — BENZONATATE 100 MG: 100 CAPSULE ORAL at 09:11

## 2024-11-19 RX ADMIN — APIXABAN 5 MG: 5 TABLET, FILM COATED ORAL at 09:01

## 2024-11-19 RX ADMIN — AZITHROMYCIN DIHYDRATE 500 MG: 250 TABLET ORAL at 09:01

## 2024-11-19 RX ADMIN — SODIUM CHLORIDE, PRESERVATIVE FREE 10 ML: 5 INJECTION INTRAVENOUS at 09:01

## 2024-11-19 ASSESSMENT — PAIN SCALES - GENERAL: PAINLEVEL_OUTOF10: 0

## 2024-11-19 NOTE — CARE COORDINATION
Pt is for discharge home today with family and no needs/supportive care orders recieved for MSW at this time.      Please consult CM for any needs that may arise.      11/19/24 1276   Service Assessment   Patient's Healthcare Decision Maker is: Legal Next of Kin  (SonOseas [phone])   Social/Functional History   Lives With Spouse   Type of Home House   Home Layout One level   ADL Assistance Independent   Homemaking Assistance Independent   Ambulation Assistance Independent   Transfer Assistance Independent   Active  Yes   Mode of Transportation Car   Occupation Retired   Services At/After Discharge   Transition of Care Consult (CM Consult) Discharge Planning   Services At/After Discharge None   [place] Resource Information Provided? No   Mode of Transport at Discharge Self   Confirm Follow Up Transport Family   Condition of Participation: Discharge Planning   The Plan for Transition of Care is related to the following treatment goals: Patient to return home with family with no needs.   The Patient and/or Patient Representative was provided with a Choice of Provider? Patient   The Patient and/Or Patient Representative agree with the Discharge Plan? Yes   Freedom of Choice list was provided with basic dialogue that supports the patient's individualized plan of care/goals, treatment preferences, and shares the quality data associated with the providers?  Yes     Leanne Acosta, MSW, LBSW    [facility]

## 2024-11-19 NOTE — DISCHARGE SUMMARY
Culture 1 [[id number]] Collected: 11/16/24 1002    Order Status: Completed Specimen: Blood Updated: 11/18/24 1250     Special Requests --        LEFT  FOREARM       Culture NO GROWTH 2 DAYS       Influenza A/B, Molecular [[id number]] Collected: 11/16/24 0948    Order Status: Completed Specimen: Nasopharyngeal Updated: 11/16/24 1026     Influenza A, JENNIFER Not detected        Comment: Negative results do not preclude infection with influenza virus and should not be the sole basis of a patient treatment decision.        Influenza B, JENNIFER Not detected       S. pneumonia Antigen, Urine/CSF [[id number]] Collected: 11/16/24 0847    Order Status: Sent Specimen: Urine Updated: 11/18/24 0856    Legionella antigen, urine [[id number]] Collected: 11/16/24 0847    Order Status: Sent Specimen: Urine Updated: 11/18/24 0855            All Labs from Last 24 Hrs:  No results found for this or any previous visit (from the past 24 hour(s)).    Recent Labs     11/16/24  1023   COVID19 Not detected       Recent Vital Data:  Patient Vitals for the past 24 hrs:   Temp Pulse Resp BP SpO2   11/19/24 0730 98.2 °F (36.8 °C) 50 -- (!) 152/74 94 %   11/18/24 2211 -- 66 18 -- 93 %   11/18/24 2002 98.2 °F (36.8 °C) 62 16 129/69 95 %       Oxygen Therapy  SpO2: 94 %  Pulse via Oximetry: 72 beats per minute  Pulse Oximeter Device Mode: Intermittent  Pulse Oximeter Device Location: Right, Finger  O2 Device: None (Room air)  O2 Flow Rate (L/min): 2 L/min    Estimated body mass index is 26.46 kg/m² as calculated from the following:    Height as of this encounter: 1.651 m (5' 5\").    Weight as of this encounter: 72.1 kg (159 lb).    Intake/Output Summary (Last 24 hours) at 11/19/2024 0913  Last data filed at 11/18/2024 1457  Gross per 24 hour   Intake 599 ml   Output --   Net 599 ml         Physical Exam:    General:    Well nourished.  No overt distress  Head:  Normocephalic, atraumatic  Eyes:  Sclerae appear normal.  Pupils equally round.    HENT:  Nares

## 2024-11-20 LAB
FLUID CULTURE: NORMAL
L PNEUMO1 AG UR QL IA: NEGATIVE
Lab: NORMAL
ORGANISM ID: NORMAL
S PNEUM AG SPEC QL LA: NEGATIVE
SPECIMEN SOURCE: NORMAL
SPECIMEN SOURCE: NORMAL
SPECIMEN: NORMAL

## 2024-11-20 NOTE — PROGRESS NOTES
Hospitalist Progress Note   Admit Date:  2024  9:11 AM   Name:  Graciela Leger   Age:  79 y.o.  Sex:  female  :  1945   MRN:  [id number]   Room:  [facility]    Presenting/Chief Complaint: Cough, Shortness of Breath, and Stool Color Change     Reason(s) for Admission: Acute respiratory failure with hypoxia [J96.01]  Severe sepsis (HCC) [A41.9, R65.20]  Pneumonia of left lower lobe due to infectious organism [J18.9]  Acute pneumonia [J18.9]     Hospital Course:   Graciela Leger is a 79 y.o. female with medical history of hypertension and atrial fibrillation.  Patient presenting secondary to worsening shortness of breath and noted to have pneumonia.  Currently being treated with IV antibiotics and improving.       Subjective & 24hr Events:   Patient resting comfortably on examination this morning.  Patient states she is feeling much better today compared to yesterday.  Patient's cough also markedly improved on exam compared to yesterday      Assessment & Plan:     Pneumonia  Patient feeling much better on exam today  Chest x-ray showing left lower lobe pneumonia  Continue ceftriaxone and azithromycin  WBC normalized  Follow-up Legionella and strep  Blood culture x 2 no growth to date  Tessalon Perles for cough  Mucinex for chest congestion  Wean oxygen as tolerated  Continue to monitor     Hypertension  Blood pressure adequately controlled at this time  Continue home medication  Continue to monitor     Atrial fibrillation  Heart rate adequately controlled  Continue home Eliquis and amiodarone      Anticipated Discharge Arrangements:   Home    PT/OT evals ordered?  Not ordered; patient not expected to need rehab  Diet:  ADULT DIET; Regular  VTE prophylaxis: Already on anticoagulation  Code status: Full Code      Non-peripheral Lines and Tubes (if present):          Telemetry (if present):  Cardiac/Telemetry Monitor On: No        Hospital Problems:  Principal Problem:    Acute pneumonia  Active 
  Physician Progress Note      PATIENT:               BELIA LINK  CSN #:                  [id number]  :                       1945  ADMIT DATE:       2024 9:11 AM  DISCH DATE:  RESPONDING  PROVIDER #:        Cezar Morris MD          QUERY TEXT:    Patient admitted with PNA, noted to have atrial fibrillation and is maintained   on Eliquis. If possible, please document in progress notes and discharge   summary if you are evaluating and/or treating any of the following:?  ?  The medical record reflects the following:  Risk Factors: advanced age 80yo, female, HTN, Afib, Eliquis  Clinical Indicators: H&P : Atrial fibrillation, Heart rate adequately   controlled  Treatment: Continue home Eliquis and amiodarone      Thank You, Kianna CDS  Options provided:  -- Secondary hypercoagulable state in a patient with atrial fibrillation  -- Other - I will add my own diagnosis  -- Disagree - Not applicable / Not valid  -- Disagree - Clinically unable to determine / Unknown  -- Refer to Clinical Documentation Reviewer    PROVIDER RESPONSE TEXT:    This patient has secondary hypercoagulable state in a patient with atrial   fibrillation.    Query created by: Lea Ortiz on 2024 12:07 PM      Electronically signed by:  Cezar Morris MD 2024 1:29 PM          
  Physician Progress Note      PATIENT:               BELIA LINK  CSN #:                  [id number]  :                       1945  ADMIT DATE:       2024 9:11 AM  DISCH DATE:        2024 12:59 PM  RESPONDING  PROVIDER #:        Real Kennedy MD          QUERY TEXT:    Patient admitted with PNA. Documentation reflects Sepsis in admission   diagnosis PN , .  DS  , Discharge diagnosis: Only pneumonia documented, sepsis not   documented  If possible, please document in the progress notes  sepsis was:    The medical record reflects the following:  Risk Factors: PNA  Clinical Indicators: H&P: Chest x-ray showing left lower lobe pneumonia. WBC   14.3, pro calcitonin 2.1.   BP: 94/51,  Treatment: ceftriaxone and azithromycin, DC on Omnicef, doxycycline    Thank You, Kianna CDS  Options provided:  -- Sepsis POA confirmed after study  -- Sepsis ruled out after study  -- Other - I will add my own diagnosis  -- Disagree - Not applicable / Not valid  -- Disagree - Clinically unable to determine / Unknown  -- Refer to Clinical Documentation Reviewer    PROVIDER RESPONSE TEXT:    Sepsis POA confirmed after study.    Query created by: Lea Ortiz on 2024 6:48 AM      Electronically signed by:  Real Kennedy MD 2024 1:13 PM          
Self not primary RN. 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     
TRANSFER - IN REPORT:    Verbal report received from LUIS ANTONIO Urena on Graciela MORIAH Leger  being received from ER for routine progression of patient care      Report consisted of patient's Situation, Background, Assessment and   Recommendations(SBAR).     Information from the following report(s) ED SBAR was reviewed with the receiving nurse.    Opportunity for questions and clarification was provided.      Assessment completed upon patient's arrival to unit and care assumed.    
atrial complexes  Borderline repolarization abnormality  Prolonged QT interval    Confirmed by Sly Butt MD ([id number]) on 11/16/2024 2:38:27 PM     CBC with Auto Differential    Collection Time: 11/16/24  9:48 AM   Result Value Ref Range    WBC 14.3 (H) 4.3 - 11.1 K/uL    RBC 4.13 4.05 - 5.2 M/uL    Hemoglobin 12.7 11.7 - 15.4 g/dL    Hematocrit 36.8 35.8 - 46.3 %    MCV 89.1 82.0 - 102.0 FL    MCH 30.8 26.1 - 32.9 PG    MCHC 34.5 31.4 - 35.0 g/dL    RDW 13.0 11.9 - 14.6 %    Platelets 339 150 - 450 K/uL    MPV 10.2 9.4 - 12.3 FL    nRBC 0.00 0.0 - 0.2 K/uL    Differential Type AUTOMATED      Neutrophils % 89 (H) 43 - 78 %    Lymphocytes % 3 (L) 13 - 44 %    Monocytes % 6 4.0 - 12.0 %    Eosinophils % 0 (L) 0.5 - 7.8 %    Basophils % 0 0.0 - 2.0 %    Immature Granulocytes % 1 0.0 - 5.0 %    Neutrophils Absolute 12.8 (H) 1.7 - 8.2 K/UL    Lymphocytes Absolute 0.5 0.5 - 4.6 K/UL    Monocytes Absolute 0.9 0.1 - 1.3 K/UL    Eosinophils Absolute 0.0 0.0 - 0.8 K/UL    Basophils Absolute 0.0 0.0 - 0.2 K/UL    Immature Granulocytes Absolute 0.1 0.0 - 0.5 K/UL   Comprehensive Metabolic Panel    Collection Time: 11/16/24  9:48 AM   Result Value Ref Range    Sodium 138 136 - 145 mmol/L    Potassium 3.3 (L) 3.5 - 5.1 mmol/L    Chloride 100 98 - 107 mmol/L    CO2 24 20 - 29 mmol/L    Anion Gap 14 7 - 16 mmol/L    Glucose 136 (H) 70 - 99 mg/dL    BUN 22 8 - 23 MG/DL    Creatinine 1.15 (H) 0.60 - 1.10 MG/DL    Est, Glom Filt Rate 48 (L) >60 ml/min/1.73m2    Calcium 9.2 8.8 - 10.2 MG/DL    Total Bilirubin 1.0 0.0 - 1.2 MG/DL    ALT 21 8 - 45 U/L    AST 21 15 - 37 U/L    Alk Phosphatase 127 (H) 35 - 104 U/L    Total Protein 6.5 6.3 - 8.2 g/dL    Albumin 3.4 3.2 - 4.6 g/dL    Globulin 3.1 2.3 - 3.5 g/dL    Albumin/Globulin Ratio 1.1 1.0 - 1.9     Magnesium    Collection Time: 11/16/24  9:48 AM   Result Value Ref Range    Magnesium 2.0 1.8 - 2.4 mg/dL   Lactate, Sepsis    Collection Time: 11/16/24  9:48 AM   Result Value Ref Range 
hour(s)).      Recent Labs     11/16/24  1023   COVID19 Not detected       Current Meds:  Current Facility-Administered Medications   Medication Dose Route Frequency    azithromycin (ZITHROMAX) tablet 500 mg  500 mg Oral Once    amiodarone (CORDARONE) tablet 100 mg  100 mg Oral Daily    apixaban (ELIQUIS) tablet 5 mg  5 mg Oral Q12H    sodium chloride flush 0.9 % injection 5-40 mL  5-40 mL IntraVENous 2 times per day    sodium chloride flush 0.9 % injection 5-40 mL  5-40 mL IntraVENous PRN    0.9 % sodium chloride infusion   IntraVENous PRN    potassium chloride (KLOR-CON M) extended release tablet 40 mEq  40 mEq Oral PRN    Or    potassium bicarb-citric acid (EFFER-K) effervescent tablet 40 mEq  40 mEq Oral PRN    Or    potassium chloride 10 mEq/100 mL IVPB (Peripheral Line)  10 mEq IntraVENous PRN    magnesium sulfate 2000 mg in 50 mL IVPB premix  2,000 mg IntraVENous PRN    ondansetron (ZOFRAN-ODT) disintegrating tablet 4 mg  4 mg Oral Q8H PRN    Or    ondansetron (ZOFRAN) injection 4 mg  4 mg IntraVENous Q6H PRN    polyethylene glycol (GLYCOLAX) packet 17 g  17 g Oral Daily PRN    acetaminophen (TYLENOL) tablet 650 mg  650 mg Oral Q6H PRN    Or    acetaminophen (TYLENOL) suppository 650 mg  650 mg Rectal Q6H PRN    cefTRIAXone (ROCEPHIN) 1,000 mg in sterile water 10 mL IV syringe  1,000 mg IntraVENous Q24H    benzonatate (TESSALON) capsule 100 mg  100 mg Oral TID PRN    guaiFENesin (MUCINEX) extended release tablet 600 mg  600 mg Oral BID    pravastatin (PRAVACHOL) tablet 40 mg  40 mg Oral Nightly       Signed:  Real Kennedy MD    Part of this note may have been written by using a voice dictation software.  The note has been proof read but may still contain some grammatical/other typographical errors.

## 2024-11-21 LAB
BACTERIA SPEC CULT: NORMAL
BACTERIA SPEC CULT: NORMAL
SERVICE CMNT-IMP: NORMAL
SERVICE CMNT-IMP: NORMAL

## 2024-11-26 ENCOUNTER — OFFICE VISIT (OUTPATIENT)
Age: 79
End: 2024-11-26

## 2024-11-26 VITALS
HEIGHT: 65 IN | DIASTOLIC BLOOD PRESSURE: 88 MMHG | WEIGHT: 161 LBS | HEART RATE: 78 BPM | BODY MASS INDEX: 26.82 KG/M2 | SYSTOLIC BLOOD PRESSURE: 138 MMHG

## 2024-11-26 DIAGNOSIS — I10 PRIMARY HYPERTENSION: ICD-10-CM

## 2024-11-26 DIAGNOSIS — I48.0 PAROXYSMAL ATRIAL FIBRILLATION (HCC): Primary | ICD-10-CM

## 2024-11-26 DIAGNOSIS — E78.2 MIXED HYPERLIPIDEMIA: ICD-10-CM

## 2024-11-26 NOTE — PROGRESS NOTES
mg/dL    BUN 24 (H) 8 - 23 MG/DL    Creatinine 1.11 (H) 0.60 - 1.10 MG/DL    Est, Glom Filt Rate 50 (L) >60 ml/min/1.73m2    Calcium 9.0 8.8 - 10.2 MG/DL   CBC with Auto Differential    Collection Time: 11/17/24  5:41 AM   Result Value Ref Range    WBC 9.2 4.3 - 11.1 K/uL    RBC 3.60 (L) 4.05 - 5.2 M/uL    Hemoglobin 10.9 (L) 11.7 - 15.4 g/dL    Hematocrit 32.6 (L) 35.8 - 46.3 %    MCV 90.6 82.0 - 102.0 FL    MCH 30.3 26.1 - 32.9 PG    MCHC 33.4 31.4 - 35.0 g/dL    RDW 13.2 11.9 - 14.6 %    Platelets 282 150 - 450 K/uL    MPV 10.4 9.4 - 12.3 FL    nRBC 0.00 0.0 - 0.2 K/uL    Differential Type AUTOMATED      Neutrophils % 82 (H) 43 - 78 %    Lymphocytes % 9 (L) 13 - 44 %    Monocytes % 8 4.0 - 12.0 %    Eosinophils % 1 0.5 - 7.8 %    Basophils % 0 0.0 - 2.0 %    Immature Granulocytes % 0 0.0 - 5.0 %    Neutrophils Absolute 7.6 1.7 - 8.2 K/UL    Lymphocytes Absolute 0.8 0.5 - 4.6 K/UL    Monocytes Absolute 0.8 0.1 - 1.3 K/UL    Eosinophils Absolute 0.1 0.0 - 0.8 K/UL    Basophils Absolute 0.0 0.0 - 0.2 K/UL    Immature Granulocytes Absolute 0.0 0.0 - 0.5 K/UL     No results found for: \"CHOL\", \"CHOLPOCT\", \"CHLST\", \"CHOLV\", \"HDL\", \"HDLPOC\", \"HDLC\", \"LDL\", \"LDLC\", \"VLDLC\", \"VLDL\"    ASSESSMENT and PLAN  Problem List Items Addressed This Visit          Circulatory    A-fib (HCC) - Primary      HTN: BP is wnl, continue current medical regimen.   Reinstitute her prior valsartan she does have hypertension and she does have CKD stage IIIa  A-fib: continue taking current medical regimen  Atrial fibrillation significantly increases your risk of developing blood clots which can lead to serious complications such as stroke.  The primary benefit of starting anticoagulation therapy is to reduce this risk.  By thinning your blood the anticoagulation helps prevent the formation of clots that could potentially travel to your brain and cause a stroke which is a life-threatening event.  Studies have shown that anticoagulation therapy

## 2025-01-29 ENCOUNTER — TELEPHONE (OUTPATIENT)
Age: 80
End: 2025-01-29

## 2025-01-29 NOTE — TELEPHONE ENCOUNTER
Patient stated her eliquis medication really expensive for her to get it went from $144 a month to $500 and she wants to know if she can take Warfarin instead ?

## 2025-01-30 NOTE — TELEPHONE ENCOUNTER
Called and let pt know that Dr. Weeks was fine with switching her to warfarin will send to coumadin clinic to get her scheduled. JS

## 2025-01-30 NOTE — TELEPHONE ENCOUNTER
Spoke to pt and informed her of the importance of regular INR checks and pt is not interested in doing that.     Pt is going to stay on Eliquis, provided DMD info. Pt will call the office after setting up account.     Patient wondering if samples are available in [place]. Please call pt.

## 2025-01-31 ENCOUNTER — TELEPHONE (OUTPATIENT)
Age: 80
End: 2025-01-31

## 2025-01-31 NOTE — TELEPHONE ENCOUNTER
Pt stated she order meds from [facility],  [facility] stated that someone from the office need call [facility]     Pt stated you can give her a called

## 2025-01-31 NOTE — TELEPHONE ENCOUNTER
Called and spoke with patient and she stated that she will call [facility] and see what the price is. JS

## 2025-01-31 NOTE — TELEPHONE ENCOUNTER
Patient stated she was returning your call.patient stated that she was on 2.5 MG Eliquis also. Please call patient

## 2025-01-31 NOTE — TELEPHONE ENCOUNTER
Pt is calling needing to speak with a nurse about ordering her coumadain from [facility] please call pt [phone]  Pt needs to speak with someone today please

## 2025-02-03 ENCOUNTER — TELEPHONE (OUTPATIENT)
Age: 80
End: 2025-02-03

## 2025-02-03 NOTE — TELEPHONE ENCOUNTER
Patient is calling again and said she needs to know today about her Eliquis . She has one for tonight and one for in the morning and she will be OUT Please call and let her know

## 2025-02-03 NOTE — TELEPHONE ENCOUNTER
Has set up account with [facility] They need Eliquis RX  #[id number]     Will not have enough to last until they send it. Do we have samples?  in [place] office  Please call

## 2025-02-14 ENCOUNTER — TELEPHONE (OUTPATIENT)
Age: 80
End: 2025-02-14

## 2025-02-18 ENCOUNTER — TELEPHONE (OUTPATIENT)
Age: 80
End: 2025-02-18

## 2025-02-20 NOTE — TELEPHONE ENCOUNTER
Spoke with patient and she stated that her pcp has her on the valsartan to see what her BP ranges

## 2025-02-25 ENCOUNTER — OFFICE VISIT (OUTPATIENT)
Age: 80
End: 2025-02-25
Payer: MEDICARE

## 2025-02-25 VITALS
HEART RATE: 64 BPM | DIASTOLIC BLOOD PRESSURE: 102 MMHG | HEIGHT: 65 IN | BODY MASS INDEX: 26.16 KG/M2 | WEIGHT: 157 LBS | SYSTOLIC BLOOD PRESSURE: 168 MMHG

## 2025-02-25 DIAGNOSIS — I10 PRIMARY HYPERTENSION: Primary | ICD-10-CM

## 2025-02-25 DIAGNOSIS — I48.0 PAROXYSMAL ATRIAL FIBRILLATION (HCC): ICD-10-CM

## 2025-02-25 DIAGNOSIS — N18.30 STAGE 3 CHRONIC KIDNEY DISEASE, UNSPECIFIED WHETHER STAGE 3A OR 3B CKD (HCC): ICD-10-CM

## 2025-02-25 PROCEDURE — 3080F DIAST BP >= 90 MM HG: CPT | Performed by: INTERNAL MEDICINE

## 2025-02-25 PROCEDURE — 1126F AMNT PAIN NOTED NONE PRSNT: CPT | Performed by: INTERNAL MEDICINE

## 2025-02-25 PROCEDURE — 1123F ACP DISCUSS/DSCN MKR DOCD: CPT | Performed by: INTERNAL MEDICINE

## 2025-02-25 PROCEDURE — G8428 CUR MEDS NOT DOCUMENT: HCPCS | Performed by: INTERNAL MEDICINE

## 2025-02-25 PROCEDURE — 3077F SYST BP >= 140 MM HG: CPT | Performed by: INTERNAL MEDICINE

## 2025-02-25 PROCEDURE — G8417 CALC BMI ABV UP PARAM F/U: HCPCS | Performed by: INTERNAL MEDICINE

## 2025-02-25 PROCEDURE — 1090F PRES/ABSN URINE INCON ASSESS: CPT | Performed by: INTERNAL MEDICINE

## 2025-02-25 PROCEDURE — 1036F TOBACCO NON-USER: CPT | Performed by: INTERNAL MEDICINE

## 2025-02-25 PROCEDURE — G8399 PT W/DXA RESULTS DOCUMENT: HCPCS | Performed by: INTERNAL MEDICINE

## 2025-02-25 PROCEDURE — 99214 OFFICE O/P EST MOD 30 MIN: CPT | Performed by: INTERNAL MEDICINE

## 2025-02-25 RX ORDER — VALSARTAN AND HYDROCHLOROTHIAZIDE 160; 12.5 MG/1; MG/1
1 TABLET, FILM COATED ORAL DAILY
Qty: 90 TABLET | Refills: 3 | Status: SHIPPED | OUTPATIENT
Start: 2025-02-25

## 2025-02-25 NOTE — PROGRESS NOTES
your condition with regular follow-ups and blood test to ensure the medication is working effectively and to minimize the risk of complications.  It is also important that you avoid activities that could increase your risk of bleeding and you should promptly report any signs of unusual bleeding or bruising.    Ultimately the goal of anticoagulation therapy is to protect you from serious complications of atrial fibrillation while managing any potential risks in a controlled manner.  We will continue to assess your situation regularly and adjust her treatment as needed to ensure the best possible outcomes.          Continue meds as below    Current Outpatient Medications:     Turmeric (QC TUMERIC COMPLEX PO), Take by mouth, Disp: , Rfl:     apixaban (ELIQUIS) 5 MG TABS tablet, Take 1 tablet by mouth 2 times daily, Disp: 180 tablet, Rfl: 3    Biotin 5000 MCG CAPS, Take by mouth, Disp: , Rfl:     Omega-3 Fatty Acids (FISH OIL ADULT GUMMIES PO), Take by mouth, Disp: , Rfl:     amiodarone (CORDARONE) 200 MG tablet, Take 0.5 tablets by mouth daily, Disp: 45 tablet, Rfl: 3    Cholecalciferol 50 MCG (2000 UT) TABS, Take by mouth, Disp: , Rfl:     albuterol sulfate HFA (VENTOLIN HFA) 108 (90 Base) MCG/ACT inhaler, Inhale 2 puffs into the lungs 4 times daily as needed for Wheezing (Patient not taking: Reported on 2/25/2025), Disp: 8.5 Device, Rfl: 0    pravastatin (PRAVACHOL) 40 MG tablet, Take 1 tablet by mouth daily (Patient not taking: Reported on 11/26/2024), Disp: 90 tablet, Rfl: 3    Orders Placed This Encounter    Turmeric (QC TUMERIC COMPLEX PO)     Sig: Take by mouth        Rad Kraus  2/25/2025  10:16 AM    Appropriate evaluation of guideline directed medical therapy for the patient's conditions have been reviewed.  If appropriate, adjustment of therapy for underlying cardiac issues has been offered.  If patient wishes for adjustment of therapy which would include intensification of pharmacologic therapy for

## 2025-05-19 RX ORDER — AMIODARONE HYDROCHLORIDE 200 MG/1
100 TABLET ORAL DAILY
Qty: 45 TABLET | Refills: 3 | Status: SHIPPED | OUTPATIENT
Start: 2025-05-19

## 2025-05-19 NOTE — TELEPHONE ENCOUNTER
Requested Prescriptions     Pending Prescriptions Disp Refills    amiodarone (CORDARONE) 200 MG tablet [Pharmacy Med Name: AMIODARONE 200MG    TAB] 45 tablet 3     Sig: Take 0.5 tablets by mouth daily         Verified rx. Last OV 2/25/25. Erx to pharm on file.